# Patient Record
Sex: MALE | Race: WHITE | NOT HISPANIC OR LATINO | URBAN - METROPOLITAN AREA
[De-identification: names, ages, dates, MRNs, and addresses within clinical notes are randomized per-mention and may not be internally consistent; named-entity substitution may affect disease eponyms.]

---

## 2023-09-27 PROBLEM — K21.9 GASTROESOPHAGEAL REFLUX DISEASE: Status: ACTIVE | Noted: 2023-09-27

## 2023-09-27 PROBLEM — K21.9 ACID REFLUX: Status: ACTIVE | Noted: 2023-09-27

## 2023-09-27 PROBLEM — E84.8 EXOCRINE PANCREATIC MANIFESTATION OF CYSTIC FIBROSIS (MULTI): Status: ACTIVE | Noted: 2023-09-27

## 2023-09-27 PROBLEM — E84.0 CYSTIC FIBROSIS WITH PULMONARY MANIFESTATIONS (MULTI): Status: ACTIVE | Noted: 2023-09-27

## 2023-09-27 PROBLEM — E84.9 CYSTIC FIBROSIS (MULTI): Status: ACTIVE | Noted: 2023-09-27

## 2023-09-27 PROBLEM — E84.9 CYSTIC FIBROSIS (MULTI): Status: RESOLVED | Noted: 2023-09-27 | Resolved: 2023-09-27

## 2023-09-27 PROBLEM — A49.8 PSEUDOMONAS AERUGINOSA INFECTION: Status: ACTIVE | Noted: 2023-09-27

## 2023-09-27 PROBLEM — D36.9 ADENOMATOUS POLYPS: Status: ACTIVE | Noted: 2023-09-27

## 2023-09-27 PROBLEM — R73.02 IMPAIRED GLUCOSE TOLERANCE: Status: ACTIVE | Noted: 2023-09-27

## 2023-09-27 RX ORDER — ELEXACAFTOR, TEZACAFTOR, AND IVACAFTOR 100-50-75
2 KIT ORAL
COMMUNITY
Start: 2019-11-14 | End: 2023-11-15

## 2023-09-27 RX ORDER — HYDROGEN PEROXIDE 3 %
1 SOLUTION, NON-ORAL MISCELLANEOUS
COMMUNITY
Start: 2021-06-09

## 2023-09-27 RX ORDER — OSELTAMIVIR PHOSPHATE 75 MG/1
75 CAPSULE ORAL 2 TIMES DAILY PRN
COMMUNITY
Start: 2019-03-28

## 2023-09-27 RX ORDER — PANCRELIPASE 24000; 76000; 120000 [USP'U]/1; [USP'U]/1; [USP'U]/1
CAPSULE, DELAYED RELEASE PELLETS ORAL
COMMUNITY
Start: 2014-08-12 | End: 2024-01-08 | Stop reason: SDUPTHER

## 2023-09-27 RX ORDER — TOBRAMYCIN 28 MG/1
4 CAPSULE ORAL; RESPIRATORY (INHALATION) 2 TIMES DAILY
COMMUNITY
End: 2023-10-04 | Stop reason: WASHOUT

## 2023-09-27 RX ORDER — DORNASE ALFA 1 MG/ML
2.5 SOLUTION RESPIRATORY (INHALATION)
COMMUNITY

## 2023-09-27 RX ORDER — ALBUTEROL SULFATE 90 UG/1
2 AEROSOL, METERED RESPIRATORY (INHALATION) EVERY 4 HOURS PRN
COMMUNITY
Start: 2020-04-01 | End: 2023-10-04

## 2023-09-27 RX ORDER — LEVALBUTEROL INHALATION SOLUTION 1.25 MG/3ML
1 SOLUTION RESPIRATORY (INHALATION) 2 TIMES DAILY
COMMUNITY
Start: 2015-03-25 | End: 2023-10-04

## 2023-09-27 RX ORDER — PEDIATRIC MULTIVIT 61/D3/VIT K 1500-800
2 CAPSULE ORAL DAILY
COMMUNITY
Start: 2021-06-09

## 2023-09-27 RX ORDER — LEVALBUTEROL TARTRATE 45 UG/1
2 AEROSOL, METERED ORAL EVERY 6 HOURS PRN
COMMUNITY
End: 2023-10-04

## 2023-10-03 ENCOUNTER — DOCUMENTATION (OUTPATIENT)
Dept: PEDIATRIC PULMONOLOGY | Facility: HOSPITAL | Age: 43
End: 2023-10-03
Payer: COMMERCIAL

## 2023-10-03 DIAGNOSIS — E84.8 EXOCRINE PANCREATIC MANIFESTATION OF CYSTIC FIBROSIS (MULTI): Primary | ICD-10-CM

## 2023-10-03 DIAGNOSIS — E84.0 CYSTIC FIBROSIS WITH PULMONARY MANIFESTATIONS (MULTI): ICD-10-CM

## 2023-10-03 NOTE — PROGRESS NOTES
Subjective   Patient ID: Tyler Lockett is a 43 y.o. male who presents for cystic fibrosis visit. Lc was open and agreeable to MHS/AA.     Social Work Visit Type: This is an Annual Visit for Tyler Lockett    Identifying Information                              : 1980  Assessment date: 10/3/2023    Objective     Patient accompanied by:  self    Parents:     Raised by:  with biological parent(s)  Parent/Guardian Info/Employment Status: Patient lives with wife and son   Relationship Status / Family Dynamic: :  Yes  How Long?  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 no      Health:  Knowledge of health:  good, Overall adherence:  good, Adherence with medications:  good, Managed by: patient, Understanding of medication:  good, and Adherence with appointments:   good    patient lives with wife and son  Does patient have adequate housing?:Owns home  Does patient feel safe in home?: Yes    Alcohol use: Yes  and Tobacco use: No   not applicable    Cognition/Mood/Affect:  Cognition:  normal  No impairment observed/reported No  The patient was neatly groomed, appropriately dressed and adequately nourished.  The patient reports his/her mood as:   Good, optimistic, stressed   Spouse (Parent if child) Mental health concerns:  none  What are the patient's psychosocial stressors:  work, managing health, and baby    Cognitive Function / Health Literacy  How do you best learn new information? Tactical learner  Do you have any history of developmental delays/learning differences/special education/OT/PT/speech therapy? no    Have you noticed any problems with or changes in:  Your attention span/concentration level for conversations, TV, reading, etc.? no  Episodes of disorientation/getting lost while driving or at a store?  no  Your ability to manage your medical regimen? yes    Thought Processes   Organized?  yes  Have you ever been hospitalized in a psychiatric hospital? no  Do you currently or have you ever seen a therapist/counselor/psychiatrist? yes  Have you used medications for mental health issues, sleep and/or pain now or in the past?  no    Legal Issues:  Citizenship:  [place] Citizen: Yes    Advance Directives  Do you have an advance directive/living will? no  Do you have a DPOA for healthcare or finances?  yes - for finances only  has an advanced directive - a copy HAS NOT been provided.  Advanced directives status:  Not activated     Strengths:  strong family and friend support  What are the patient's coping behaviors?  Stays active, hobbies, interest in cars, spends a lot of time with son, alone time, be outside     Barriers:  none reported     Impression:  PHQ-9 score: 0, FRANNY-7 score: 4    Assessment/Plan   Plan: SW met patient for AA/MHS. Patient reports brain fog from Trikafta and is working with MD on a solution. Discussed IVF funding for CF patients, SHAVON will send information via email [email or web address]. Patient had no other  related needs. SHAVON will continue to follow.  -NAKIA Smith

## 2023-10-04 ENCOUNTER — ANCILLARY PROCEDURE (OUTPATIENT)
Dept: RESPIRATORY THERAPY | Facility: HOSPITAL | Age: 43
End: 2023-10-04
Payer: COMMERCIAL

## 2023-10-04 ENCOUNTER — DOCUMENTATION (OUTPATIENT)
Dept: PEDIATRIC PULMONOLOGY | Facility: HOSPITAL | Age: 43
End: 2023-10-04

## 2023-10-04 ENCOUNTER — HOSPITAL ENCOUNTER (OUTPATIENT)
Dept: RADIOLOGY | Facility: HOSPITAL | Age: 43
Discharge: HOME | End: 2023-10-04
Payer: COMMERCIAL

## 2023-10-04 ENCOUNTER — NUTRITION (OUTPATIENT)
Dept: PEDIATRIC PULMONOLOGY | Facility: HOSPITAL | Age: 43
End: 2023-10-04

## 2023-10-04 ENCOUNTER — LAB REQUISITION (OUTPATIENT)
Dept: LAB | Facility: HOSPITAL | Age: 43
End: 2023-10-04
Payer: COMMERCIAL

## 2023-10-04 ENCOUNTER — MULTIDISCIPLINARY VISIT (OUTPATIENT)
Dept: PEDIATRIC PULMONOLOGY | Facility: HOSPITAL | Age: 43
End: 2023-10-04
Payer: COMMERCIAL

## 2023-10-04 VITALS
BODY MASS INDEX: 24.55 KG/M2 | HEIGHT: 72 IN | OXYGEN SATURATION: 96 % | WEIGHT: 181.22 LBS | RESPIRATION RATE: 16 BRPM | TEMPERATURE: 97.9 F | DIASTOLIC BLOOD PRESSURE: 87 MMHG | HEART RATE: 94 BPM | SYSTOLIC BLOOD PRESSURE: 121 MMHG

## 2023-10-04 DIAGNOSIS — E84.0 CYSTIC FIBROSIS WITH PULMONARY MANIFESTATIONS (MULTI): ICD-10-CM

## 2023-10-04 DIAGNOSIS — D36.9 ADENOMATOUS POLYPS: ICD-10-CM

## 2023-10-04 DIAGNOSIS — K21.9 GASTROESOPHAGEAL REFLUX DISEASE WITHOUT ESOPHAGITIS: Chronic | ICD-10-CM

## 2023-10-04 DIAGNOSIS — E84.0 CYSTIC FIBROSIS WITH PULMONARY MANIFESTATIONS (MULTI): Primary | Chronic | ICD-10-CM

## 2023-10-04 DIAGNOSIS — R73.02 IMPAIRED GLUCOSE TOLERANCE: Chronic | ICD-10-CM

## 2023-10-04 DIAGNOSIS — E84.8 CYSTIC FIBROSIS WITH OTHER MANIFESTATIONS (MULTI): ICD-10-CM

## 2023-10-04 DIAGNOSIS — A49.8 PSEUDOMONAS AERUGINOSA INFECTION: Chronic | ICD-10-CM

## 2023-10-04 DIAGNOSIS — E84.9 CF (CYSTIC FIBROSIS) (MULTI): ICD-10-CM

## 2023-10-04 DIAGNOSIS — E84.8 EXOCRINE PANCREATIC MANIFESTATION OF CYSTIC FIBROSIS (MULTI): Chronic | ICD-10-CM

## 2023-10-04 DIAGNOSIS — E84.8 EXOCRINE PANCREATIC MANIFESTATION OF CYSTIC FIBROSIS (MULTI): ICD-10-CM

## 2023-10-04 LAB
25(OH)D3 SERPL-MCNC: 36 NG/ML (ref 30–100)
ALBUMIN SERPL BCP-MCNC: 4.3 G/DL (ref 3.4–5)
ALP SERPL-CCNC: 88 U/L (ref 33–120)
ALT SERPL W P-5'-P-CCNC: 62 U/L (ref 10–52)
ANION GAP SERPL CALC-SCNC: 15 MMOL/L (ref 10–20)
AST SERPL W P-5'-P-CCNC: 89 U/L (ref 9–39)
BASOPHILS # BLD AUTO: 0.08 X10*3/UL (ref 0–0.1)
BASOPHILS NFR BLD AUTO: 1 %
BILIRUB SERPL-MCNC: 1 MG/DL (ref 0–1.2)
BUN SERPL-MCNC: 12 MG/DL (ref 6–23)
CALCIUM SERPL-MCNC: 9 MG/DL (ref 8.6–10.6)
CHLORIDE SERPL-SCNC: 102 MMOL/L (ref 98–107)
CO2 SERPL-SCNC: 29 MMOL/L (ref 21–32)
CREAT SERPL-MCNC: 0.73 MG/DL (ref 0.5–1.3)
DLCO: NORMAL
EOSINOPHIL # BLD AUTO: 0.18 X10*3/UL (ref 0–0.7)
EOSINOPHIL NFR BLD AUTO: 2.1 %
ERYTHROCYTE [DISTWIDTH] IN BLOOD BY AUTOMATED COUNT: 14.3 % (ref 11.5–14.5)
FEV1/FVC: 53 %
FEV1: 2.28 LITERS
FVC: 4.29 LITERS
GFR SERPL CREATININE-BSD FRML MDRD: >90 ML/MIN/1.73M*2
GGT SERPL-CCNC: 20 U/L (ref 5–64)
GLUCOSE 2H P 75 G GLC PO SERPL-MCNC: 170 MG/DL
GLUCOSE P FAST SERPL-MCNC: 52 MG/DL
GLUCOSE SERPL-MCNC: 52 MG/DL (ref 74–99)
HCT VFR BLD AUTO: 47.2 % (ref 41–52)
HGB BLD-MCNC: 16.4 G/DL (ref 13.5–17.5)
IMM GRANULOCYTES # BLD AUTO: 0.02 X10*3/UL (ref 0–0.7)
IMM GRANULOCYTES NFR BLD AUTO: 0.2 % (ref 0–0.9)
LYMPHOCYTES # BLD AUTO: 3.18 X10*3/UL (ref 1.2–4.8)
LYMPHOCYTES NFR BLD AUTO: 37.9 %
MCH RBC QN AUTO: 29.4 PG (ref 26–34)
MCHC RBC AUTO-ENTMCNC: 34.7 G/DL (ref 32–36)
MCV RBC AUTO: 85 FL (ref 80–100)
MONOCYTES # BLD AUTO: 0.77 X10*3/UL (ref 0.1–1)
MONOCYTES NFR BLD AUTO: 9.2 %
NEUTROPHILS # BLD AUTO: 4.16 X10*3/UL (ref 1.2–7.7)
NEUTROPHILS NFR BLD AUTO: 49.6 %
NRBC BLD-RTO: 0 /100 WBCS (ref 0–0)
PLATELET # BLD AUTO: 278 X10*3/UL (ref 150–450)
PMV BLD AUTO: 9.7 FL (ref 7.5–11.5)
POTASSIUM SERPL-SCNC: 3.8 MMOL/L (ref 3.5–5.3)
PROT SERPL-MCNC: 7.3 G/DL (ref 6.4–8.2)
RBC # BLD AUTO: 5.57 X10*6/UL (ref 4.5–5.9)
SODIUM SERPL-SCNC: 142 MMOL/L (ref 136–145)
TLC: NORMAL
WBC # BLD AUTO: 8.4 X10*3/UL (ref 4.4–11.3)

## 2023-10-04 PROCEDURE — 87102 FUNGUS ISOLATION CULTURE: CPT

## 2023-10-04 PROCEDURE — 82977 ASSAY OF GGT: CPT

## 2023-10-04 PROCEDURE — 82306 VITAMIN D 25 HYDROXY: CPT

## 2023-10-04 PROCEDURE — 84402 ASSAY OF FREE TESTOSTERONE: CPT

## 2023-10-04 PROCEDURE — 84446 ASSAY OF VITAMIN E: CPT

## 2023-10-04 PROCEDURE — 82785 ASSAY OF IGE: CPT

## 2023-10-04 PROCEDURE — 94010 BREATHING CAPACITY TEST: CPT

## 2023-10-04 PROCEDURE — 84590 ASSAY OF VITAMIN A: CPT

## 2023-10-04 PROCEDURE — 99215 OFFICE O/P EST HI 40 MIN: CPT | Performed by: PEDIATRICS

## 2023-10-04 PROCEDURE — 71046 X-RAY EXAM CHEST 2 VIEWS: CPT | Performed by: RADIOLOGY

## 2023-10-04 PROCEDURE — 85025 COMPLETE CBC W/AUTO DIFF WBC: CPT

## 2023-10-04 PROCEDURE — 71046 X-RAY EXAM CHEST 2 VIEWS: CPT | Mod: FY

## 2023-10-04 PROCEDURE — 82950 GLUCOSE TEST: CPT

## 2023-10-04 PROCEDURE — 80053 COMPREHEN METABOLIC PANEL: CPT

## 2023-10-04 PROCEDURE — 83951 ONCOPROTEIN DCP: CPT

## 2023-10-04 PROCEDURE — 87070 CULTURE OTHR SPECIMN AEROBIC: CPT

## 2023-10-04 PROCEDURE — 89240 UNLISTED MISC PATH TEST: CPT

## 2023-10-04 SDOH — ECONOMIC STABILITY: INCOME INSECURITY: IN THE LAST 12 MONTHS, WAS THERE A TIME WHEN YOU WERE NOT ABLE TO PAY THE MORTGAGE OR RENT ON TIME?: NO

## 2023-10-04 SDOH — HEALTH STABILITY: MENTAL HEALTH: HOW OFTEN DO YOU HAVE 6 OR MORE DRINKS ON ONE OCCASION?: NEVER

## 2023-10-04 SDOH — ECONOMIC STABILITY: HOUSING INSECURITY
IN THE LAST 12 MONTHS, WAS THERE A TIME WHEN YOU DID NOT HAVE A STEADY PLACE TO SLEEP OR SLEPT IN A SHELTER (INCLUDING NOW)?: NO

## 2023-10-04 SDOH — ECONOMIC STABILITY: TRANSPORTATION INSECURITY
IN THE PAST 12 MONTHS, HAS THE LACK OF TRANSPORTATION KEPT YOU FROM MEDICAL APPOINTMENTS OR FROM GETTING MEDICATIONS?: NO

## 2023-10-04 SDOH — HEALTH STABILITY: MENTAL HEALTH
STRESS IS WHEN SOMEONE FEELS TENSE, NERVOUS, ANXIOUS, OR CAN'T SLEEP AT NIGHT BECAUSE THEIR MIND IS TROUBLED. HOW STRESSED ARE YOU?: NOT AT ALL

## 2023-10-04 SDOH — HEALTH STABILITY: MENTAL HEALTH: HOW OFTEN DO YOU HAVE A DRINK CONTAINING ALCOHOL?: MONTHLY OR LESS

## 2023-10-04 SDOH — ECONOMIC STABILITY: TRANSPORTATION INSECURITY
IN THE PAST 12 MONTHS, HAS LACK OF TRANSPORTATION KEPT YOU FROM MEETINGS, WORK, OR FROM GETTING THINGS NEEDED FOR DAILY LIVING?: NO

## 2023-10-04 SDOH — HEALTH STABILITY: MENTAL HEALTH: HOW MANY STANDARD DRINKS CONTAINING ALCOHOL DO YOU HAVE ON A TYPICAL DAY?: 1 OR 2

## 2023-10-04 SDOH — SOCIAL STABILITY: SOCIAL NETWORK: HOW OFTEN DO YOU ATTEND CHURCH OR RELIGIOUS SERVICES?: NEVER

## 2023-10-04 SDOH — SOCIAL STABILITY: SOCIAL NETWORK
IN A TYPICAL WEEK, HOW MANY TIMES DO YOU TALK ON THE PHONE WITH FAMILY, FRIENDS, OR NEIGHBORS?: MORE THAN THREE TIMES A WEEK

## 2023-10-04 SDOH — ECONOMIC STABILITY: GENERAL
WHICH OF THE FOLLOWING DO YOU KNOW HOW TO USE AND HAVE ACCESS TO EVERY DAY? (CHOOSE ALL THAT APPLY): SMARTPHONE WITH CELLULAR DATA PLAN;DESKTOP COMPUTER, LAPTOP COMPUTER, OR TABLET WITH BROADBAND INTERNET CONNECTION

## 2023-10-04 ASSESSMENT — PATIENT HEALTH QUESTIONNAIRE - PHQ9
8. MOVING OR SPEAKING SO SLOWLY THAT OTHER PEOPLE COULD HAVE NOTICED. OR THE OPPOSITE, BEING SO FIGETY OR RESTLESS THAT YOU HAVE BEEN MOVING AROUND A LOT MORE THAN USUAL: NOT AT ALL
7. TROUBLE CONCENTRATING ON THINGS, SUCH AS READING THE NEWSPAPER OR WATCHING TELEVISION: NOT AT ALL
1. LITTLE INTEREST OR PLEASURE IN DOING THINGS: NOT AT ALL
3. TROUBLE FALLING OR STAYING ASLEEP OR SLEEPING TOO MUCH: NOT AT ALL
4. FEELING TIRED OR HAVING LITTLE ENERGY: NOT AT ALL
2. FEELING DOWN, DEPRESSED OR HOPELESS: NOT AT ALL
9. THOUGHTS THAT YOU WOULD BE BETTER OFF DEAD, OR OF HURTING YOURSELF: NOT AT ALL
5. POOR APPETITE OR OVEREATING: NOT AT ALL
6. FEELING BAD ABOUT YOURSELF - OR THAT YOU ARE A FAILURE OR HAVE LET YOURSELF OR YOUR FAMILY DOWN: NOT AT ALL

## 2023-10-04 ASSESSMENT — ANXIETY QUESTIONNAIRES
2. NOT BEING ABLE TO STOP OR CONTROL WORRYING: SEVERAL DAYS
GAD7 TOTAL SCORE: 5
7. FEELING AFRAID AS IF SOMETHING AWFUL MIGHT HAPPEN: SEVERAL DAYS
3. WORRYING TOO MUCH ABOUT DIFFERENT THINGS: SEVERAL DAYS
6. BECOMING EASILY ANNOYED OR IRRITABLE: NOT AT ALL
4. TROUBLE RELAXING: SEVERAL DAYS
1. FEELING NERVOUS, ANXIOUS, OR ON EDGE: SEVERAL DAYS
IF YOU CHECKED OFF ANY PROBLEMS ON THIS QUESTIONNAIRE, HOW DIFFICULT HAVE THESE PROBLEMS MADE IT FOR YOU TO DO YOUR WORK, TAKE CARE OF THINGS AT HOME, OR GET ALONG WITH OTHER PEOPLE: SOMEWHAT DIFFICULT
5. BEING SO RESTLESS THAT IT IS HARD TO SIT STILL: NOT AT ALL

## 2023-10-04 ASSESSMENT — PAIN SCALES - GENERAL: PAINLEVEL: 0-NO PAIN

## 2023-10-04 ASSESSMENT — LIFESTYLE VARIABLES
SKIP TO QUESTIONS 9-10: 1
AUDIT-C TOTAL SCORE: 1

## 2023-10-04 NOTE — PATIENT INSTRUCTIONS
"It was very nice to see you today. We can offer you in-person and \"virtual\" appointments with your cystic fibrosis provider.    If you need to cancel or schedule an appointment, please call the  at the [facility] at [phone] between the hours of 8 AM and 5 PM, Monday-Friday.    To reach the CF nurse, Cecilia, please call [phone]. Cecilia has a secure voice mail account if you want to leave a message.    If you need to speak with an adult cystic fibrosis provider between the hours of 5 PM and 8 AM (overnight) or anytime on Saturday or Sunday, please call [phone]. When you call this number, you will be connected to an  with the [facility] answering service. You should tell the  that you're an adult with cystic fibrosis who needs to speak to the \"cystic fibrosis doctor on-call.\" The  will take your contact information. You should then expect a call back from the cystic fibrosis doctor on-call within a short period of time.      Information on COVID-19 Vaccination:  Vaccines.gov (https://www.vaccines.gov/)  [place] Department of Health (https://gettheshot.coronavirus.[place].gov/)    Important Information:  Your CFTR mutations are:     Your percent-predicted FEV1 today was:    Your weight adjusted for height (body mass index, BMI) today was:   Body mass index is 24.31 kg/m².   (goal for adult males with CF = 23.0 kg/m2, goal for adult females with CF = 22.0 kg/m2)      Plan:  Keep up the good work  Let us know when you are ready to switch to a CF center in [place]  We'd love to see you in November to review your labs  Watch for any symptoms of Trikafta  Keep up your work with your primary care.  I'm only taking care of your CF  Good luck with your colonoscopy            "

## 2023-10-04 NOTE — SIGNIFICANT EVENT
FRANNY-7   10/04/23 1400   Over the last 2 weeks, how often have you been bothered by any of the following problems?   Feeling nervous, anxious, or on edge 1   Not being able to stop or control worrying 1   Worrying too much about different things 1   Trouble relaxing 1   Being so restless that it is hard to sit still 0   Becoming easily annoyed or irritable 0   Feeling afraid as if something awful might happen 1   FRANNY-7 Total Score 5   If you checked off any problems on this questionnaire,   How difficult have these problems made it for you to do your work, take care of things at home, or get along with other people? Somewhat difficult

## 2023-10-04 NOTE — PROGRESS NOTES
Emilia Wolfe M.D. [facility]    Background:    [place] Pulmonary Exacerbation Score (PES)        HPI (10/4/2023):    Here for annual visit    Exercising 4-5 times a week at [employer]  Not sick since last November    Bowels moving okay  No constipation    No cough  No sputum - maybe a couple of days here and there  More when he is run down  Had work annual labs    Mom first time heart failure  Grandma was diabetic  Mom is being monitored for sugars  Dad has prostate cancer  Dad moving to [place]  Likely will switch to a center in [place]    Side effects:  Foggy   If missed Trikafta he can tell a difference  Not doing any airway clearance but       Health Maintenance  Your Annual Labs were labs were last done: 11/2022  Annual Labs are next due: 11/22  Your last CXR was done:   Your next CXR will be due:  Your last DEXA scan was:  DEXA is next due:  Your last colonoscopy was done: 11/2020  Your next colonoscopy will be due: 11/2023  Your last audiology screening was: 3/2019    Date of Review: 2/16/2023    Recommendations:   1. Yearly transplant discussion  2. Audiology - on Sha   3. once a month home pft  4. consider nocturnal pulse ox study - evaluate for symptoms        Primary Cystic Fibrosis Physician: Dr. Shaneka Baca  Primary Care Physician: Pt will call with information- Has chosen a PCP but has not see yet.     Initial Diagnosis: At birth, had meconium ileus requiring surgery. Sweat chloride 76 mEq/L on 1980  Genotype: [id number] / [id number]    Diagnosis:   Cystic Fibrosis   - CFTR modulator: Trikafta  - high dose ibuprofen: no  - Azithromycin: no    Pancreatic insufficiency   - creon 24's w/meals and snacks    GERD  - esomeprazole     Impaired glucose tolerance  - had elevated OGTT  - seen by Dr. Riley     Procedures & Oral/IV course:  - Last admission for CF pulmonary exacerbation 3/8/19- 3/30/19. Treated with IV Colistin and Meropenem.    History: Covid in 2020 (recovered),  colon polyps   Hemoptysis:  DVT: no  Pneumothorax: no  CARLOS: age 14-15 had an obstruction, did clean out w/Golytely   Sinus surgery: no  Fertility: wife had a baby in June 2022    Nursing Issues:   Typically grows: 1/2020 Pseudomonas mucoid and rough   Long term Azithromycin therapy: No  CF Cx: 11/2022 PSAm  Last acid fast: 6/2021 negative  Last fungal culture:11/2022negative  Annual labs:11/2022  CXR: 6/2021  Audiology: 3/13/2019   IV access: PICC line when needed  PSQI: 3/10/2021  [employer]: consented 6/9/2021  Transplant Ed: unknown     Respiratory: 02/16/2023  FEV1%: 51% November 2022  Airway clearance: exercise [employer] 3 - 4 times a week (afflo vest as needed)  Hypertonic saline: No, too harsh and causes extreme coughing  Pulmozyme: no stopped (August 2020)  Inhaled abx: no ([place] in the past) (wilber podhaler discontinued 7.29.20)  Other inhaled meds: no (levalbuterol in the past)  Oxygen: No, Nocturnal pulse ox study done 9/18/2018 almost qualified 4.9 minutes at or below 88%, repeat nocturnal pulse ox study done while an in-patient March 2019, no desats below 88%.  Recommendations: use home spirometer once a month. If unable to exercise, use afflo vest. Consider nocturnal pulse ox study on room air.       Nutritional Assessment:   - Nutritional status: BMI >25 (25.2)  - Body image: Pt. interested in wt. loss  - Body Composition: HGS within 1 SD of average for age  - Supplements/TF: Ensure Plus  - GI symptom tracker: Needs 23  - Balanced diet: Pt. has been increasing fruit and vegetable intake  - Calcium intake: Adequate  - Programs (Enzyme/WIC/[facility]): [facility]  - OGTT: Needs 23  - Vitamin levels: Needs 23  - Colonoscopy: Adenomas 2020; Repeat 23 (NICE?)  - DEXA: Osteopenia 21; Repeat 24      Emily Nageotte RDN, LD       Psychosocial:   Substance use:none  Reproduction/ relationships:, has a son  Insurance responsibilities: Private insurance, no concerns  Disease progression/ Future planning:  discuss with team  Advance directives: was planning on completing in [place], follow up on  Transplant/palliative care: discuss with team  Recommendations/summary: Tyler is a 43 y.o. , with a new baby. He lives in [place] and works remotely. He has concerns about the economy and raising a child now that he's a new dad. His wife is supportive regarding his health. No insurance concerns at this time. Had concerns about [employer] program, concerns passed to pharmacy. Plans to complete HCPOA, recommended that he complete in [place].  will continue to provide support as needed.  NAKIA Smith     Research: Lc can be referred to [place] for research if PFT's are >40%.    Pharmacy: Tyler has the CFTR genotype [id number] and [id number], been on Trikafta since Dec 2019, doing well on it.   Medication related labs: LFTs drawn Nov 2022 (WNL) repeat annually.  Drug-Drug interactions: Drug-drug interactions were evaluated, no significant drug-drug interactions identified.  Medication access: He fills with [employer] [facility] and nonspecialty meds from [employer] - no known obtainment issues. Uses [employer] program.  Immunizations: Declines bivalent COVID booster and 2022 season flu vaccine. Received PCV vaccine in 2019.  Summary: Continue current therapies. Annual LFTs.  Rajwinder Hamilton, JennaD, BCPS, BCPPS              Current Medications     Current Outpatient Medications   Medication Instructions    elexacaftor-tezacaftor-ivacaft (Trikafta) 100-50-75 mg tablet 2 tablets, oral,  Take 2 elexacaftor 100/tezacaftor 50/ivacaftor 75 tabs in the morning and 1 ivacaftor 150 tab in the evening 12 hours apart     esomeprazole (NexIUM) 20 mg DR capsule 1 capsule, oral, Daily before breakfast    lipase-protease-amylase (Creon) 24,000-76,000 -120,000 unit capsule oral, Take 5 capsules by mouth before meals and 2-3 capsules with snacks daily    oseltamivir (TAMIFLU) 75 mg, oral, 2 times daily PRN    pediatric multivit 61-D3-vit K (MVW Complete  Formulation ) 5,000-800 unit-mcg capsule 2 capsules, oral, Daily    Pulmozyme 2.5 mg, nebulization, Daily RT       Physical Examination     General: ambulated independently; no acute distress; well-nourished; work of breathing was not increased; normal vocal character  HEENT: normocephalic; anicteric sclerae; conjunctivae not injected; nasal mucosa was unremarkable; oropharynx was clear without evidence of thrush; dentition was good.  Neck: supple; no lymphadenopathy or thyromegaly.  Chest: clear to auscultation bilaterally; no chest wall deformity.  Cardiac: regular rhythm; no gallop or murmur.  Abdomen: soft; non-tender; non-distended; no hepatosplenomegaly.  Extremities: no leg edema; no digital clubbing; 2+ pulses  Psychiatric: did not appear depressed or anxious.     Metabolic Parameters     Sodium   Date/Time Value Ref Range Status   11/23/2022 09:16  136 - 145 mmol/L Final     Potassium   Date/Time Value Ref Range Status   11/23/2022 09:16 AM 4.3 3.5 - 5.3 mmol/L Final     Chloride   Date/Time Value Ref Range Status   11/23/2022 09:16  98 - 107 mmol/L Final     Bicarbonate   Date/Time Value Ref Range Status   11/23/2022 09:16 AM 30 21 - 32 mmol/L Final     Anion Gap   Date/Time Value Ref Range Status   11/23/2022 09:16 AM 12 10 - 20 mmol/L Final     Urea Nitrogen   Date/Time Value Ref Range Status   11/23/2022 09:16 AM 15 6 - 23 mg/dL Final     Creatinine   Date/Time Value Ref Range Status   11/23/2022 09:16 AM 0.77 0.50 - 1.30 mg/dL Final     GFR MALE   Date/Time Value Ref Range Status   11/23/2022 09:16 AM >90 >90 mL/min/1.73m2 Final     Comment:      CALCULATIONS OF ESTIMATED GFR ARE PERFORMED   USING THE 2021 CKD-EPI STUDY REFIT EQUATION   WITHOUT THE RACE VARIABLE FOR THE IDMS-TRACEABLE   CREATININE METHODS.    https://jasn.asnjournals.org/content/early/2021/09/22/ASN.6408423255       Glucose   Date/Time Value Ref Range Status   11/23/2022 09:16 AM 90 74 - 99 mg/dL Final     Calcium    Date/Time Value Ref Range Status   11/23/2022 09:16 AM 8.9 8.6 - 10.6 mg/dL Final       Hematologic Parameters     WBC   Date/Time Value Ref Range Status   11/23/2022 09:16 AM 7.5 4.4 - 11.3 x10E9/L Final     Neutrophils Absolute   Date/Time Value Ref Range Status   11/23/2022 09:16 AM 3.81 1.20 - 7.70 x10E9/L Final     Neutrophils %   Date/Time Value Ref Range Status   11/23/2022 09:16 AM 50.8 40.0 - 80.0 % Final     Lymphocytes %   Date/Time Value Ref Range Status   11/23/2022 09:16 AM 34.4 13.0 - 44.0 % Final     Monocytes %   Date/Time Value Ref Range Status   11/23/2022 09:16 AM 9.6 2.0 - 10.0 % Final     Basophils %   Date/Time Value Ref Range Status   11/23/2022 09:16 AM 0.8 0.0 - 2.0 % Final     Eosinophils Absolute   Date/Time Value Ref Range Status   11/23/2022 09:16 AM 0.31 0.00 - 0.70 x10E9/L Final     Eosinophils %   Date/Time Value Ref Range Status   11/23/2022 09:16 AM 4.1 0.0 - 6.0 % Final     Hemoglobin   Date/Time Value Ref Range Status   11/23/2022 09:16 AM 15.6 13.5 - 17.5 g/dL Final     Hematocrit   Date/Time Value Ref Range Status   11/23/2022 09:16 AM 45.7 41.0 - 52.0 % Final     RBC   Date/Time Value Ref Range Status   11/23/2022 09:16 AM 5.18 4.50 - 5.90 x10E12/L Final     MCV   Date/Time Value Ref Range Status   11/23/2022 09:16 AM 88 80 - 100 fL Final     MCHC   Date/Time Value Ref Range Status   11/23/2022 09:16 AM 34.1 32.0 - 36.0 g/dL Final     Platelets   Date/Time Value Ref Range Status   11/23/2022 09:16  150 - 450 x10E9/L Final       Fat-Soluble Vitamin Levels     Vitamin D, 25-Hydroxy   Date/Time Value Ref Range Status   11/23/2022 09:16 AM 36 ng/mL Final     Comment:     .  DEFICIENCY:         < 20   NG/ML  INSUFFICIENCY:      20-29  NG/ML  SUFFICIENCY:         NG/ML    THIS ASSAY ACCURATELY QUANTIFIES THE SUM OF  VITAMIN D3, 25-HYDROXY AND VIT D2,25-HYDROXY.       Vitamin A (Retinol)   Date/Time Value Ref Range Status   11/23/2022 09:16 AM 33.4 20.1 - 62.0 ug/dL  Final     Comment:     Reference intervals for vitamin A determined from [facility] internal  studies. Individuals with vitamin A less than 20 ug/dL are considered  vitamin A deficient and those with serum concentrations less than  10 ug/dL are considered severely deficient.  This test was developed and its performance characteristics  determined by [facility]. It has not been cleared or approved  by the Food and Drug Administration.  Test(s) 070141-Vitamin E(Alpha Tocopherol); 000793-  Vitamin E(Gamma Tocopherol)  was developed and its performance characteristics determined  by [facility]. It has not been cleared or approved by the Food  and Drug Administration.       Vitamin E (Alpha-Tocopherol)   Date/Time Value Ref Range Status   11/23/2022 09:16 AM 5.4 (L) 7.0 - 25.1 mg/L Final     Vitamin E (Gamma-Tocopherol)   Date/Time Value Ref Range Status   11/23/2022 09:16 AM 0.6 0.5 - 5.5 mg/L Final     Comment:     Reference intervals for alpha and gamma-tocopherol determined from  National Health and Nutrition Examination Survey, 3621-1800.  Individuals with alpha-tocopherol levels less than 5.0 mg/L are  considered vitamin E deficient.  Test(s) 070141-Vitamin E(Alpha Tocopherol); 951857-  Vitamin E(Gamma Tocopherol)  was developed and its performance characteristics determined  by [facility]. It has not been cleared or approved by the Food  and Drug Administration.         LFT and Associated Parameters     AST   Date/Time Value Ref Range Status   11/23/2022 09:16 AM 23 9 - 39 U/L Final     ALT (SGPT)   Date/Time Value Ref Range Status   11/23/2022 09:16 AM 44 10 - 52 U/L Final     Comment:      Patients treated with Sulfasalazine may generate    falsely decreased results for ALT.       Alkaline Phosphatase   Date/Time Value Ref Range Status   11/23/2022 09:16 AM 80 33 - 120 U/L Final     Total Bilirubin   Date/Time Value Ref Range Status   11/23/2022 09:16 AM 0.6 0.0 - 1.2 mg/dL Final     Bilirubin, Direct   Date/Time Value Ref  Range Status   11/04/2020 02:56 PM 0.2 0.0 - 0.3 mg/dL Final     GGT   Date/Time Value Ref Range Status   11/23/2022 09:16 AM 19 5 - 64 U/L Final     Albumin   Date/Time Value Ref Range Status   11/23/2022 09:16 AM 4.1 3.4 - 5.0 g/dL Final     Total Protein   Date/Time Value Ref Range Status   11/23/2022 09:16 AM 6.6 6.4 - 8.2 g/dL Final       Coagulation Parameters     Protime   Date/Time Value Ref Range Status   11/23/2022 09:16 AM 10.4 9.8 - 13.4 sec Final     INR   Date/Time Value Ref Range Status   11/23/2022 09:16 AM 0.9 0.9 - 1.1 Final       Diabetes Laboratory Tests     Hemoglobin A1C   Date/Time Value Ref Range Status   11/23/2022 09:16 AM 5.7 (A) % Final     Comment:          Diagnosis of Diabetes-Adults   Non-Diabetic: < or = 5.6%   Increased risk for developing diabetes: 5.7-6.4%   Diagnostic of diabetes: > or = 6.5%  .       Monitoring of Diabetes                Age (y)     Therapeutic Goal (%)   Adults:          >18           <7.0   Pediatrics:    13-18           <7.5                   7-12           <8.0                   0- 6            7.5-8.5   American Diabetes Association. Diabetes Care 33(S1), Jan 2010.       Albumin/Creatine Ratio   Date/Time Value Ref Range Status   06/09/2021 12:00 AM 6.1 0.0 - 30.0 ug/mg crt Final     Glucose tolerance test Fasting   Date/Time Value Ref Range Status   11/23/2022 09:40  <126 mg/dL Final     Glucose tolerance test Two Hour   Date/Time Value Ref Range Status   11/23/2022 09:40 AM 75 <200 mg/dL Final        Immunology Laboratory Tests     IgE   Date/Time Value Ref Range Status   11/23/2022 09:16 AM <2 0 - 214 IU/mL Final       DEXA Scan     No results found for this or any previous visit from the past 1000 days.       Chest Radiograph     CHEST 2 VIEW 06/09/2021    Narrative  MRN: [id number]  Patient Name: CRISTINO OLVERA    STUDY:   CHEST 2 VIEW PA AND LAT;  6/9/2021 1:03 pm    INDICATION:  cystic fibrosis.    COMPARISON:  03/08/2019.    ACCESSION  "NUMBER(S):  [id number]    ORDERING CLINICIAN:  SHANEKA BACA    FINDINGS:      CARDIOMEDIASTINAL SILHOUETTE:  Prominence of the jose may represent residual lymphadenopathy versus  pulmonary artery hypertension. This is stable.    LUNGS:  Interval improvement in multiple nodular opacities with residual  peribronchial thickening. Residual middle lobe/lingular atelectasis.  Left midlung nodular density may represent residual mucoid impaction  versus nipple shadow.    ABDOMEN:  No remarkable upper abdominal findings.    BONES:  No acute osseous changes.    Impression  1.  Interval improvement in extensive mucoid impaction/peribronchial  disease when compared to a study of 03/08/2019. Residual apical  scarring and left midlung opacity which may represent mucoid  impaction versus prominent nipple shadow.       CF Sputum Culture     No results found for: \"AFBCX\"   No results found for: \"RESPCULTCYFI\"    No results found for the last 90 days.      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 NO pain No constipation         Impaired glucose tolerance    Current Assessment & Plan     Resolved last time, but will recheck today per guidelines         Pseudomonas aeruginosa infection    Current Assessment & Plan     Chronic colonization         Acid reflux          Shaneka Baca MD PhD   10/04/2023   "

## 2023-10-04 NOTE — SIGNIFICANT EVENT
PHQ-9   10/04/23 1400   Over the last 2 weeks, how often have you been bothered by any of the following problems?   Little Interest or Pleasure in Doing Things Not at all   Feeling Down, Depressed, or Hopeless Not at all   Trouble Falling or Staying Asleep, or Sleeping Too Much Not at all   Feeling Tired or Having Little Energy Not at all   Poor Appetite or Overeating Not at all   Feeling Bad About Yourself - or That You Are a Failure or Have Let Yourself or Your Family Down Not at all   Trouble Concentrating on Things, Such as Reading the Newspaper or Watching Television Not at all   Moving or speaking so slowly that other people could have noticed? Or the opposite - being so fidgety or restless that you have been moving around a lot more than usual. Not at all   Thoughts That You Would Be Better Off Dead or Hurting Yourself in Some Way Not at all

## 2023-10-04 NOTE — PROGRESS NOTES
PT Evaluation: CF Clinic      Pain  0/10    Social History  Pt lives at home with wife and 15 months old son. Works full time in IT, travels for work and also [facility].   Shared household and childcare duties.    Respiratory  What do you do for Airway clearance?  Exercise only. Has not needed to do traditional ACT since starting Trikafta.     Modulator Therapy: Trikafta                                       Exercise: current program  [employer] 4-5x/week: weight machines , treadmill walking(20 min) and running(4-5 min)  AM workouts  Very active outdoors (snowboarding, etc)     FEV1 today: 51%  Osteopenia/Osteoporosis: +Osteopenia     Musculoskeletal    Standing lateral side bend: WFL    Standing trunk rotation, flexion, extension WFL      MMT:   R LE 5/5  L LE 5/5    Exercise Tests/Outcome Measures: RPD/RPE on all tests  1)Sit to Stand  60 sec STS : 24x   bpm, spO2 94% on room air  RPD 1/10, RPE 10/20      2)   3 min Step test: metronome to 96  HR/O2 PRE test : HR 85 bpm, spO2 95%    Immediately post    bpm  O2 93%    AFTER 1 min rest:  %  O2 96%    3) Plank Hold  Total time: 29 seconds  *Lc was sore from recent workouts, likely would hold plank much longer but limited 2/2 abd soreness     What body part gave out first?  Abdomen    Assessment:  Lc is a 42 y/o male who presents to the CF clinic, pleasant and agreeable to participate in a PT evaluation as part of his clinic today. Lc is very active, and exercises regularly. He scored the following on exercise testing today:  1)1 min STS: 25th percentile for age/sex matched norms  2)3 min step test: average HR recovery for age/sex matched norms  PT will plan to follow up with patient at subsequent visits when indicated.

## 2023-10-04 NOTE — PROGRESS NOTES
Respiratory Note:  10/4/23  Patient's Airway Clearance: [place] Vest  Frequency: PRN when sick  Settings: Ramps up x 3 x 15-30 min  Exercise: Works out at the gym 3-5 times per week cardio and circuit training  Oscillating Device: No  Barry Cough: No  Primary: Exercise  Secondary: Vest    Aerosols: Name of medication, frequency, type of nebulizer used, Mask or Mouthpiece?  Bronchodilators: No inhaled medications at this time  Pulmozyme: no  Hypertonic Saline: no  Antibiotics: no  ICS/Combinations: no    Order of medication w/airway clearance: N/A      Hearing Test:   Spacer: Yes  Compressor: Has a working compressor  Home PFT Device: NEREYDA Spirobank  Frequency: Monthly and PRN (has not used since Aug 2022)  Technique: Good  Home PFT Action Plan: Yes    Method of Cleaning Neb Cups: Top of the   Method of Cleaning PFT Device: soap and water and sterile water, then soak in 3% hydrogen peroxide x 30 min.    Pharmacy for respiratory meds: [facility]  Patient Assistance Program:   Oxygen: No  Home Care Company:  LPM:  Continuous, nocturnal, PRN, intermittent w/exacerbation: No  CPAP, BIPAP, Assisted Breathing (use at home or in-patient): No  Have you smoked cigarettes in the last year?: No  Are you exposed to second hand smoke or electronic cigarette vapor?: No  Does anyone in your household smoke cigarettes?: No  Did this patient use electronic cigarettes (vape) this year? No  During the reporting year, how often was this patient vaping? Not at all  Every Day...Some days...Not at all...Unknown.      Teaching: Lc's health has been very good since the initiation of Trikafta. He has a very good exercise regimen and his lung function has remained much improved and stable. Lc includes vest therapy when he is sick. No respiratory needs today.

## 2023-10-04 NOTE — PROGRESS NOTES
"Pt. Is here alone today. Son is now 15 months. Recently drove with son and Dad from [place] to [place] for car repairs.     Pt. Reports his appetite has been good. Wife does cooking and grocery shopping. She \"sneaks\" healthier foods into meals. Pt. Denies having any GI concerns. Pt. Goes to the gym 4 days/week. Pt. Has Colonoscopy scheduled for September. RD provided prep instructions to the pt. Pt. Used to track BS when started Trikafta, but was told he could stop because his blood sugars were well-managed. Pt. Is having annual labs drawn today.    ALLISON: 72.8 (fit), PBF 27.2%      Impression:  BMI at goal  Pt. Having vitamin labs drawn today  Pt. Eats well-balanced diet  Pt. Is physically active  Pt. Has colonoscopy scheduled for November    Recommendations:  Recommend maintain level of physical activity  Recommend continue to eat well-balanced diet  RD will reach out to pt. If labs are abnormal  Recommend reach out to RD as nutritional concerns arise    "

## 2023-10-04 NOTE — PROGRESS NOTES
Nurse Assessment: 10/4/2023    Pain assessment: denied   SOB: denied   3.   O2:  __N/a___L  4.   Mediport: n/a  5.   Pt discussion/chief complaint/agenda:  Not concerns with his health. Colonoscopy scheduled with Dr. Moraes on 11/21/23. Desean is growing and doing well.

## 2023-10-05 LAB — IGE SERPL-ACNC: 3 IU/ML (ref 0–214)

## 2023-10-06 LAB — BACTERIA SPT CF RESP CULT: NORMAL

## 2023-10-08 LAB
A-TOCOPHEROL VIT E SERPL-MCNC: 7.5 MG/L (ref 5.5–18)
ANNOTATION COMMENT IMP: NORMAL
BETA+GAMMA TOCOPHEROL SERPL-MCNC: 0.3 MG/L (ref 0–6)
RETINYL PALMITATE SERPL-MCNC: <0.02 MG/L (ref 0–0.1)
TESTOSTERONE FREE (CHAN): 72.8 PG/ML (ref 35–155)
TESTOSTERONE,TOTAL,LC-MS/MS: 607 NG/DL (ref 250–1100)
VIT A SERPL-MCNC: 0.4 MG/L (ref 0.3–1.2)

## 2023-10-09 LAB — SCAN RESULT: NORMAL

## 2023-10-23 LAB
FUNGUS SPEC CULT: NORMAL
FUNGUS SPEC FUNGUS STN: NORMAL

## 2023-10-29 LAB
HOLD SPECIMEN: NORMAL
HOLD SPECIMEN: NORMAL

## 2023-11-13 DIAGNOSIS — E84.9 CYSTIC FIBROSIS (MULTI): ICD-10-CM

## 2023-11-15 RX ORDER — ELEXACAFTOR, TEZACAFTOR, AND IVACAFTOR 100-50-75
KIT ORAL
Qty: 84 TABLET | Refills: 11 | Status: SHIPPED | OUTPATIENT
Start: 2023-11-15

## 2023-11-20 ENCOUNTER — DOCUMENTATION (OUTPATIENT)
Dept: GASTROENTEROLOGY | Facility: HOSPITAL | Age: 43
End: 2023-11-20
Payer: COMMERCIAL

## 2023-11-20 DIAGNOSIS — E84.9 CYSTIC FIBROSIS (MULTI): ICD-10-CM

## 2023-11-20 RX ORDER — SYRING-NEEDL,DISP,INSUL,0.3 ML 29 G X1/2"
SYRINGE, EMPTY DISPOSABLE MISCELLANEOUS
Qty: 296 ML | Refills: 0 | Status: SHIPPED | OUTPATIENT
Start: 2023-11-20

## 2023-11-20 RX ORDER — POLYETHYLENE GLYCOL 3350, SODIUM SULFATE ANHYDROUS, SODIUM BICARBONATE, SODIUM CHLORIDE, POTASSIUM CHLORIDE 236; 22.74; 6.74; 5.86; 2.97 G/4L; G/4L; G/4L; G/4L; G/4L
POWDER, FOR SOLUTION ORAL
Qty: 4000 ML | Refills: 1 | OUTPATIENT
Start: 2023-11-20 | End: 2023-11-21

## 2023-11-20 NOTE — PROGRESS NOTES
Patient called about colonoscopy prep which hadn't been ordered for his upcoming colonoscopy tomorrow. I called the patient and he informed me that Dr. Anton had ordered it for him and he was waiting at the [facility] to pick it up. I provided some brief advice on how to take the prep. I told him to call the Endoscopy suite in the morning around 7am if he is still not clear by that time.     Patient voiced understanding and thanked me for following up on his phone call.

## 2023-11-21 ENCOUNTER — HOSPITAL ENCOUNTER (OUTPATIENT)
Dept: GASTROENTEROLOGY | Facility: HOSPITAL | Age: 43
Setting detail: OUTPATIENT SURGERY
Discharge: HOME | End: 2023-11-21
Payer: COMMERCIAL

## 2023-11-21 VITALS
BODY MASS INDEX: 24.52 KG/M2 | WEIGHT: 185 LBS | DIASTOLIC BLOOD PRESSURE: 85 MMHG | OXYGEN SATURATION: 94 % | HEART RATE: 94 BPM | HEIGHT: 73 IN | SYSTOLIC BLOOD PRESSURE: 113 MMHG | RESPIRATION RATE: 21 BRPM | TEMPERATURE: 97.5 F

## 2023-11-21 DIAGNOSIS — Z86.010 HISTORY OF ADENOMATOUS POLYP OF COLON: ICD-10-CM

## 2023-11-21 DIAGNOSIS — D36.9 BENIGN NEOPLASM, UNSPECIFIED SITE: ICD-10-CM

## 2023-11-21 DIAGNOSIS — E84.9 CYSTIC FIBROSIS, UNSPECIFIED (MULTI): Primary | ICD-10-CM

## 2023-11-21 PROCEDURE — 45385 COLONOSCOPY W/LESION REMOVAL: CPT | Performed by: INTERNAL MEDICINE

## 2023-11-21 PROCEDURE — 88305 TISSUE EXAM BY PATHOLOGIST: CPT | Mod: TC,SUR | Performed by: INTERNAL MEDICINE

## 2023-11-21 PROCEDURE — 2500000004 HC RX 250 GENERAL PHARMACY W/ HCPCS (ALT 636 FOR OP/ED): Performed by: INTERNAL MEDICINE

## 2023-11-21 PROCEDURE — 3700000013 HC SEDATION LEVEL 5+ TIME - EACH ADDITIONAL 15 MINUTES: Performed by: INTERNAL MEDICINE

## 2023-11-21 PROCEDURE — 99153 MOD SED SAME PHYS/QHP EA: CPT | Performed by: INTERNAL MEDICINE

## 2023-11-21 PROCEDURE — 7100000010 HC PHASE TWO TIME - EACH INCREMENTAL 1 MINUTE: Performed by: INTERNAL MEDICINE

## 2023-11-21 PROCEDURE — 88305 TISSUE EXAM BY PATHOLOGIST: CPT | Performed by: PATHOLOGY

## 2023-11-21 PROCEDURE — 99152 MOD SED SAME PHYS/QHP 5/>YRS: CPT | Performed by: INTERNAL MEDICINE

## 2023-11-21 PROCEDURE — 7100000009 HC PHASE TWO TIME - INITIAL BASE CHARGE: Performed by: INTERNAL MEDICINE

## 2023-11-21 PROCEDURE — 3700000012 HC SEDATION LEVEL 5+ TIME - INITIAL 15 MINUTES 5/> YEARS: Performed by: INTERNAL MEDICINE

## 2023-11-21 RX ORDER — FENTANYL CITRATE 50 UG/ML
INJECTION, SOLUTION INTRAMUSCULAR; INTRAVENOUS AS NEEDED
Status: COMPLETED | OUTPATIENT
Start: 2023-11-21 | End: 2023-11-21

## 2023-11-21 RX ORDER — DIPHENHYDRAMINE HYDROCHLORIDE 50 MG/ML
INJECTION INTRAMUSCULAR; INTRAVENOUS AS NEEDED
Status: COMPLETED | OUTPATIENT
Start: 2023-11-21 | End: 2023-11-21

## 2023-11-21 RX ORDER — MIDAZOLAM HYDROCHLORIDE 1 MG/ML
INJECTION, SOLUTION INTRAMUSCULAR; INTRAVENOUS AS NEEDED
Status: COMPLETED | OUTPATIENT
Start: 2023-11-21 | End: 2023-11-21

## 2023-11-21 RX ORDER — SODIUM CHLORIDE, SODIUM LACTATE, POTASSIUM CHLORIDE, CALCIUM CHLORIDE 600; 310; 30; 20 MG/100ML; MG/100ML; MG/100ML; MG/100ML
20 INJECTION, SOLUTION INTRAVENOUS CONTINUOUS
Status: DISCONTINUED | OUTPATIENT
Start: 2023-11-21 | End: 2023-11-22 | Stop reason: HOSPADM

## 2023-11-21 RX ADMIN — MIDAZOLAM HYDROCHLORIDE 1 MG: 1 INJECTION, SOLUTION INTRAMUSCULAR; INTRAVENOUS at 13:31

## 2023-11-21 RX ADMIN — DIPHENHYDRAMINE HYDROCHLORIDE 25 MG: 50 INJECTION, SOLUTION INTRAMUSCULAR; INTRAVENOUS at 13:29

## 2023-11-21 RX ADMIN — FENTANYL CITRATE 25 MCG: 50 INJECTION, SOLUTION INTRAMUSCULAR; INTRAVENOUS at 13:31

## 2023-11-21 RX ADMIN — MIDAZOLAM HYDROCHLORIDE 2 MG: 1 INJECTION, SOLUTION INTRAMUSCULAR; INTRAVENOUS at 13:25

## 2023-11-21 RX ADMIN — FENTANYL CITRATE 50 MCG: 50 INJECTION, SOLUTION INTRAMUSCULAR; INTRAVENOUS at 13:24

## 2023-11-21 RX ADMIN — DIPHENHYDRAMINE HYDROCHLORIDE 25 MG: 50 INJECTION, SOLUTION INTRAMUSCULAR; INTRAVENOUS at 13:25

## 2023-11-21 RX ADMIN — MIDAZOLAM HYDROCHLORIDE 1 MG: 1 INJECTION, SOLUTION INTRAMUSCULAR; INTRAVENOUS at 13:58

## 2023-11-21 RX ADMIN — FENTANYL CITRATE 25 MCG: 50 INJECTION, SOLUTION INTRAMUSCULAR; INTRAVENOUS at 13:44

## 2023-11-21 RX ADMIN — MIDAZOLAM HYDROCHLORIDE 1 MG: 1 INJECTION, SOLUTION INTRAMUSCULAR; INTRAVENOUS at 13:35

## 2023-11-21 RX ADMIN — MIDAZOLAM HYDROCHLORIDE 1 MG: 1 INJECTION, SOLUTION INTRAMUSCULAR; INTRAVENOUS at 13:43

## 2023-11-21 RX ADMIN — FENTANYL CITRATE 25 MCG: 50 INJECTION, SOLUTION INTRAMUSCULAR; INTRAVENOUS at 13:38

## 2023-11-21 ASSESSMENT — PAIN SCALES - GENERAL

## 2023-11-21 ASSESSMENT — PAIN - FUNCTIONAL ASSESSMENT
PAIN_FUNCTIONAL_ASSESSMENT: 0-10

## 2023-11-21 ASSESSMENT — COLUMBIA-SUICIDE SEVERITY RATING SCALE - C-SSRS
1. IN THE PAST MONTH, HAVE YOU WISHED YOU WERE DEAD OR WISHED YOU COULD GO TO SLEEP AND NOT WAKE UP?: NO
2. HAVE YOU ACTUALLY HAD ANY THOUGHTS OF KILLING YOURSELF?: NO
6. HAVE YOU EVER DONE ANYTHING, STARTED TO DO ANYTHING, OR PREPARED TO DO ANYTHING TO END YOUR LIFE?: NO

## 2023-11-21 NOTE — PRE-SEDATION DOCUMENTATION
Patient: Tyler Lockett  MRN: [id number]    Pre-sedation Evaluation:  Sedation necessary for: Other: Colonoscopy  Requesting service: GI    History of Present Illness: Surveillance colonoscopy for adenomatous polyps.  CRC in paternal grandfather diagnosed age >60 and multiple polyps in patient's father.     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 Take before meals.      lipase-protease-amylase (Creon) 24,000-76,000 -120,000 unit capsule Take by mouth. Take 5 capsules by mouth before meals and 2-3 capsules with snacks daily      magnesium citrate (Citrate of Magnesia) solution Take as directed for colonoscopy 296 mL 0    oseltamivir (Tamiflu) 75 mg capsule Take 1 capsule (75 mg) by mouth 2 times a day as needed (fever or flu-like symptoms).      pediatric multivit 61-D3-vit K (MVW Complete Formulation ) 5,000-800 unit-mcg capsule Take 2 capsules by mouth once daily.      Trikafta 100-50-75 mg(d) /150 mg (n) tablet TAKE 2 ORANGE TABLETS IN THE MORNING AND 1 BLUE TABLET IN THE EVENING APPROXIMATELY 12 HOURS APART. TAKE WITH FATTY FOODS 84 tablet 11     dornase Alpha (Pulmozyme) 1 mg/mL nebulizer solution Take 2.5 mg by nebulization once daily.      polyethylene glycol-electrolytes (polyethylene glycol) 420 gram solution Dispense two 4,000ml bottles - take ad directed for colonoscopy/ (Patient not taking: Reported on 11/21/2023) 4000 mL 1     Current Facility-Administered Medications   Medication Dose Route Frequency Provider Last Rate Last Admin    lactated Ringer's infusion  20 mL/hr intravenous Continuous Nalini Moraes MD         Past Surgical History:   has no past surgical history on file.    Recent sedation/surgery (24 hours) No    Review of Systems:  Please check all that apply: No significant difficulty with sedation        NPO guidelines met: Yes    Physical Exam    Airway  Mallampati: II     Cardiovascular   Rhythm: regular     Dental    Pulmonary   Breath sounds clear to auscultation  (+) decreased breath sounds         Plan    ASA 3     Moderate   (Patient's procedure was ordered with moderate sedation but he asked for anesthesia today.  I was not able to get anesthesia staff on short notice however.  Patient is agreeable to proceeding with moderate sedation.)

## 2023-11-21 NOTE — DISCHARGE INSTRUCTIONS
Patient Instructions after a Colonoscopy      The anesthetics, sedatives or narcotics which were given to you today will be acting in your body for the next 24 hours, so you might feel a little sleepy or groggy.  This feeling should slowly wear off. Carefully read and follow the instructions.     You received sedation today:  - Do not drive or operate any machinery or power tools of any kind.   - No alcoholic beverages today, not even beer or wine.  - Do not make any important decisions or sign any legal documents.  - No over the counter medications that contain alcohol or that may cause drowsiness.  - Do not make any important decisions or sign any legal documents.    While it is common to experience mild to moderate abdominal distention, gas, or belching after your procedure, if any of these symptoms occur following discharge from the GI Lab or within one week of having your procedure, call the [facility] to be advised whether a visit to your nearest Urgent Care or Emergency Department is indicated.  Take this paper with you if you go.     - If you develop an allergic reaction to the medications that were given during your procedure such as difficulty breathing, rash, hives, severe nausea, vomiting or lightheadedness.  - If you experience chest pain, shortness of breath, severe abdominal pain, fevers and chills.  -If you develop signs and symptoms of bleeding such as blood in your spit, if your stools turn black, tarry, or bloody  - If you have not urinated within 8 hours following your procedure.  - If your IV site becomes painful, red, inflamed, or looks infected.    If you received a biopsy/polypectomy/sphincterotomy the following instructions apply below:    __ Do not use Aspirin containing products, non-steroidal medications or anti-coagulants for one week following your procedure. (Examples of these types of medications are: Advil, Arthrotec, Aleve, Coumadin, Ecotrin, Heparin, Ibuprofen,  Indocin, Motrin, Naprosyn, Nuprin, Plavix, Vioxx, and Voltarin, or their generic forms.  This list is not all-inclusive.  Check with your physician or pharmacist before resuming medications.)   __ Eat a soft diet today.  Avoid foods that are poorly digested for the next 24 hours.  These foods would include: nuts, beans, lettuce, red meats, and fried foods. Start with liquids and advance your diet as tolerated, gradually work up to eating solids.   __ Do not have a Barium Study or Enema for one week.    Your physician recommends the additional following instructions:    -You have a contact number available for emergencies. The signs and symptoms of potential delayed complications were discussed with you. You may return to normal activities tomorrow.  -Resume your previous diet.  -Continue your present medications.   -We are waiting for your pathology results.  -Your physician has recommended a repeat colonoscopy (date to be determined after pending pathology results are reviewed) for surveillance based on pathology results.  -The findings and recommendations have been discussed with you.  -The findings and recommendations were discussed with your family.  - Please see Medication Reconciliation Form for new medication/medications prescribed.       If you experience any problems or have any questions following discharge from the GI Lab, please call:        Nurse Signature                                                                        Date___________________                                                                            Patient/Responsible Party Signature                                        Date___________________

## 2023-11-21 NOTE — H&P
"History Of Present Illness  Tyler Lockett is a 43 y.o. male presenting with surveillance colonoscopy, had 3 adenomatous polyps in 2020.     Past Medical History  Past Medical History:   Diagnosis Date    COVID-19 08/04/2020    COVID-19    Cystic fibrosis (CMS/HCC)     Personal history of colonic polyps     History of colon polyps       Surgical History  History reviewed. No pertinent surgical history.     Social History  He reports that he has never smoked. He has never used smokeless tobacco. He reports that he does not drink alcohol and does not use drugs.    Family History  No family history on file.     Allergies  Patient has no known allergies.      Physical Exam   NAD   Decreased breath sounds but CTA   RRR S1 S2   Soft nontender nondistended   +Clubbing    Last Recorded Vitals  Blood pressure 130/80, pulse 85, temperature 36.4 °C (97.5 °F), resp. rate 20, height 1.854 m (6' 1\"), weight 83.9 kg (185 lb), SpO2 94 %.    Relevant Results  Colonoscopy 2020 - 3 adenomatous polyps.     Assessment/Plan   Active Problems:  There are no active Hospital Problems.    Surveillance colonoscopy in 2020.  Procedure was ordered with moderate sedation.  Patient requested anesthesia but it is unfortunately not available on short notice.      Nalini Moraes MD    "

## 2023-12-06 LAB
LABORATORY COMMENT REPORT: NORMAL
PATH REPORT.FINAL DX SPEC: NORMAL
PATH REPORT.GROSS SPEC: NORMAL
PATH REPORT.TOTAL CANCER: NORMAL

## 2024-01-08 DIAGNOSIS — E84.8 EXOCRINE PANCREATIC MANIFESTATION OF CYSTIC FIBROSIS (MULTI): ICD-10-CM

## 2024-01-08 DIAGNOSIS — E84.0 CYSTIC FIBROSIS WITH PULMONARY MANIFESTATIONS (MULTI): Primary | ICD-10-CM

## 2024-01-08 RX ORDER — PANCRELIPASE 24000; 76000; 120000 [USP'U]/1; [USP'U]/1; [USP'U]/1
CAPSULE, DELAYED RELEASE PELLETS ORAL
Qty: 1900 CAPSULE | Refills: 3 | Status: SHIPPED | OUTPATIENT
Start: 2024-01-08 | End: 2025-01-07

## 2024-01-16 DIAGNOSIS — E84.9 CYSTIC FIBROSIS (MULTI): Primary | ICD-10-CM

## 2024-02-13 ENCOUNTER — ALLIED HEALTH (OUTPATIENT)
Dept: PEDIATRIC PULMONOLOGY | Facility: HOSPITAL | Age: 44
End: 2024-02-13
Payer: COMMERCIAL

## 2024-02-13 NOTE — PROGRESS NOTES
Test  Result  Flag  Reference    -----NURSING 2/16/2023 [place]      Annual labs 11/2022      Annual Visit/ Case Review CR 2/116/23      Chest XRay 6/2021      Audiology (see below)      Result:  Pt is on Sha podhaler - 3/13/2019    -----NUTRITION EN 9-23 RD Annual Assessment Needs 23      Nutritional Status BMI >25      Body Composition HGS within 1 SD of average for age      Body Image Pt. interested in wt. loss      Supplements/TF Ensure Plus      Seen by GI No      GI Symptom Tracker Needs 23      Balanced Diet (see below)      Result:  Increasing fruit and vegetable intake    Calcium Intake Adequate      Programs (Enzyme /WIC/ [facility]) Careforward      Pancreatic Enzymes Creon      DEXA Osteopenia 21; Repeat 24      OGTT Needs 23      Seen by Endo No      Vitamin Levels Needs 23      Colonoscopy Repeat 23; TAs 2020      Transplant/Pregnancy Nutrition Needs N/A      CFTR Modulator Trikafta started 11/2019      -----RESEARCH PSQI 3/10/2021      Biobank Consented 6/9/2021            Findings History:            [id number]   Item Name 1 1 1 1 1 2 1   Advance Directives      Will complete in [place]    Airway clearance  Exercise (is not using Afflo Vest) exercise (has not been using afflo vest)   Afflo Vest Afflo Vest /Aerobika   Annual labs 11/2022 Pt will have done in [place] on 10/31/22; Emailed orders 8/30/22 6/9/21  3/2020  Annual labs done Mar 2019 (vitamin A & E done Jan 2019)   Balanced Diet Increasing fruit and vegetable intake Increasing fruits and vegetables Increasing fruit and vegetable intake   Pt. has low fruit and vegetable intake; Pt.'s family helping to improve intake assess   Body Composition HGS within 1 SD of average for age HGS within 1 SD of average for age HGS within 1 SD of average for age   HGS within 1SD of average for age    Body Image Pt. interested in wt. loss Pt. interested in wt. loss Pt. interested in losing some wt.   Pt. pleased  "with wt. gain no issues noted   Calcium Intake Adequate Adequate Adequate   Pt. consumes calcium-rich foods multiple times daily encouraged aydee in light of DXA results   CFTR Modulator Trikafta started 11/2019  Trikafta last labs 2/21/2020; due now  Trikafta Trikafta 12/2/2019; Symdeko   Colonoscopy Repeat 23; TAs 2020 Repeat 23 Repeat 23; Adenomas 2020   Done 2020-awating pathology Colonoscopy - done 3/2019 - had 6 polyps removed, 2 clips placed - need repeat colonoscopy in 2020   DEXA Osteopenia 21; Repeat 24 Osteopenia 21; Repeat 24 6/9/2021   Needs repeat 2021 3/11//2019 osteopenia of LS. repeat in 2021   EDUCATION      Annual apt done 1/8/2020, CR 1/31/2020, folder mailed to pt    Flu vaccine  Declined 2022   -- Declined 11/2020 Refused 10/22/19    GI Symptom Tracker Needs 23 Needs 22 Needs 21   Done 2020; No concerns needed   High Risk Behaviors      no concerns    Hypertonic Saline  No no   No is too harsh and causes extreme coughing No PRN   Ibuprofen   No   not on high dose ibuprofen IBU PK - not on high dose Ibuprofen   Infection Control  Booster declined 11/23/2022; COVID Pfizer 5/6/21; 3/15/2021    Tested positive for COVID 7/2020    Insurance Responsibilities      Private    Lung Disease Severity  FEV1 51% June 2021    Intermediate Advanced   Mediport  no     None   Mental Health Screen      1/8/2020 01/21/2019   NURSING 2/16/2023 [place] 10/24/22    Audio 3/13/2019 CXR 3/8/19: 7/19 allergey testing recommended for next visit; Needs PCP \"academic internist\" perferred; nocturnal desat done 3/19 - no O2 needed   NUTRITION EN 9-23 EN 11-22 EN 6-21   EN  10-19 TS   Nutritional status BMI >25 BMI at goal BMI >25   BMI at goal borderline at risk (BMI 22.76)   OGTT Needs 23 Elevated fasting Repeat 21   WNL 3/29/19 2 hour 175-IGT. discussed w pt. No symptoms and BMI close to normal. Trying to cut back on soda   Pancreatic Enzymes Creon Creon Creon   Creon Creon   PHARMACY   6/6/21 EM   [facility]   Pneumovax     3/2019     Programs (Enzyme /WIC/ [facility]) Careforward Careforward Careforward   Creon program Sent info on Creon program over summer. Thinks he had enrolled in past but wasn't currently using   PULMONARY  11/21/22 06/03/2021   10/28/2020 10.22.19 PA   Pulmozyme  No (D/C August 2020) no stopped August 2020   Yes Qday yes QD   RD Annual Assessment Needs 23 Needs 22 Needs 21   Done 2020 done for 2019   Reproduction/Relationships      Engaged, Discussed IVF with MD Got engaged, will be doing IVF in about two years   RESEARCH PSQI 3/10/2021  PQSI 3/10/21       RT Annual Assessment   done 02/02/2021   1.8.20 Completed 10.22.19 PA   Substance Use      no concerns    Supplements/TF Ensure Plus Ensure Plus Ensure Plus   Ensure Plus none   SW Assessment      1/8/2020 01/18/2019   Transplant/Pregnancy Nutrition Needs N/A N/A N/A   N/A    Vitamin Levels Needs 23 WNL Needs 21   WNL D and E deficiencies, encouraged regular vitamin supplementation.   Seen by GI No No No   No    Seen by Endo No No No   No    Inhaled Antibiotic  Cayston PRN not taking 8/31/22 (D/C Wilber Podhaler 07/2020) yes cayston as needed (stopped wilber podhaler 7.29.20)   Cayston QOM (7.29.20 stopping Wilber Pod per Dr. Baca)    Other Inhaled Med  Albuterol MDI, levalbuterol nebs PRN yes albuterol inhaler as needed   ProAir (was on Levalbuterol Tartrate but insurance rejected)    Biobank Consented 6/9/2021  Consent 6/9/21 1st draw       Annual Visit/ Case Review CR 2/116/23 6/9/22; CR: 2/17/22; sent ISO 2/8/22 6/9/2021; CR 2/18/2021  Needs in Feb 2021     Chest XRay 6/2021 6/2021 6/9/21  3/2019     Audiology Pt is on Wilber podhaler - 3/13/2019 3/13/2019        Azithromycin   No       Chronic Oral Antibiotics   No       Oxygen/non-invasive support  No no   no    Home Spirometer  NEREYDA Ayondo, last used August 2022 yes   Yes NEREYDA Spirobank    Referral to Pulmonary Rehab      no    CoPay Assistance      yes

## 2024-03-07 PROBLEM — E84.9 CYSTIC FIBROSIS (MULTI): Status: ACTIVE | Noted: 2024-03-07

## 2024-05-01 ENCOUNTER — TELEPHONE (OUTPATIENT)
Dept: PEDIATRIC PULMONOLOGY | Facility: HOSPITAL | Age: 44
End: 2024-05-01
Payer: COMMERCIAL

## 2024-11-04 DIAGNOSIS — E84.9 CYSTIC FIBROSIS: ICD-10-CM

## 2024-11-04 RX ORDER — ELEXACAFTOR, TEZACAFTOR, AND IVACAFTOR 100-50-75
KIT ORAL
Qty: 84 TABLET | Refills: 0 | Status: SHIPPED | OUTPATIENT
Start: 2024-11-04

## 2024-11-18 ENCOUNTER — DOCUMENTATION (OUTPATIENT)
Dept: PEDIATRIC PULMONOLOGY | Facility: HOSPITAL | Age: 44
End: 2024-11-18
Payer: COMMERCIAL

## 2024-11-18 DIAGNOSIS — E84.0 CYSTIC FIBROSIS WITH PULMONARY MANIFESTATIONS (MULTI): Primary | ICD-10-CM

## 2024-11-18 DIAGNOSIS — A49.8 PSEUDOMONAS AERUGINOSA INFECTION: ICD-10-CM

## 2024-11-18 DIAGNOSIS — E84.9 CF (CYSTIC FIBROSIS) (MULTI): ICD-10-CM

## 2024-11-18 RX ORDER — NEBULIZER
EACH MISCELLANEOUS
Qty: 1 EACH | Refills: 0 | Status: SHIPPED | OUTPATIENT
Start: 2024-11-18

## 2024-11-18 RX ORDER — ALBUTEROL SULFATE 0.83 MG/ML
2.5 SOLUTION RESPIRATORY (INHALATION) EVERY 6 HOURS PRN
Qty: 360 ML | Refills: 0 | Status: SHIPPED | OUTPATIENT
Start: 2024-11-18 | End: 2025-11-18

## 2024-11-18 RX ORDER — ALBUTEROL SULFATE 90 UG/1
2 INHALANT RESPIRATORY (INHALATION) EVERY 6 HOURS PRN
Qty: 18 G | Refills: 0 | Status: SHIPPED | OUTPATIENT
Start: 2024-11-18 | End: 2025-11-18

## 2024-11-18 RX ORDER — AZITHROMYCIN 250 MG/1
250 TABLET, FILM COATED ORAL DAILY
Qty: 10 TABLET | Refills: 0 | Status: SHIPPED | OUTPATIENT
Start: 2024-11-18 | End: 2024-11-29 | Stop reason: ALTCHOICE

## 2024-11-19 NOTE — PROGRESS NOTES
"I received this message from Cecilia Morocho:     \"2. Lc Lockett:  C/c: x 1 week - nasal congestion, yellow drainage, Lt ear piercing pain, ears feel full, fever on the 9th and 11th (100. F and 101.0F) intermittent low grade fevers. Coughing in the morning and at night only.  He is out of [facility].   Advised he may need to go to an urgent care or minute clinic to look in his ears.  Please advise. \"    [place]c [place] of [place] so I could not offer him a virtual appointment. He said he has been very healthy and usually has let small illness slide but this time he feels he needs to get in touch with his CF team. In the beginning of the month he was traveling for work - 4 states in 1 week and sleeping in multiple hotel rooms. Thinks he picked something up then. Two Saturdays ago (11/9) felt like symptoms hit him at once and was laying around most of the day. Had a sore throat, increased cough, thick yellow sputum, ears full. By 11/12 he felt slightly better but is still sick. He did restart Cayston which he had some extra at home. Prior to getting sick he hadn't taken it for years. I did discuss with him that mycoplasma pneumonia is on the rise and that I am going to treat him with azithromycin 250 mg PO for 10 days and refill his Cayston. I did also refill albuterol and recommended he used it prior to his inhaled antibiotic. He, his wife and son will be in [place] for about 6 days next week and he is willing to be seen for an in person follow up which he said is due. I also told him I would order sputum cultures and have Cecilia send them over via email for him to take to a local lab. If he doesn't do this then we can obtain sputum in clinic next week.    Marilia Morrell, CNP  "

## 2024-11-21 ENCOUNTER — TELEPHONE (OUTPATIENT)
Dept: PEDIATRIC PULMONOLOGY | Facility: HOSPITAL | Age: 44
End: 2024-11-21
Payer: COMMERCIAL

## 2024-11-21 NOTE — TELEPHONE ENCOUNTER
RD called pt. To check in. Pt. Reports that he is doing well. He has been traveling a lot    He admits to waiting on a refill for Creon. At this point, he is waiting for delivery to be shipped. He plans to call if he gets close to running out and it still hasn't been delivered. Pt. Denies having any GI concerns.He has been getting a variety of foods in his diet. He has not weighed himself recently, but plans to when able. Pt. Reports that his BS is well-managed. He goes to the gym 4x/week and does the treadmill and weights. Pt. Had labs drawn earlier this year. He plans to have them faxed to  for review.    Pt. Had no further concerns at this time. RD provided contact info and encouraged pt. To reach out as nutritional concerns arise. Pt. Was agreeable.

## 2024-11-27 DIAGNOSIS — E84.0 CYSTIC FIBROSIS WITH PULMONARY MANIFESTATIONS (MULTI): Primary | ICD-10-CM

## 2024-11-27 RX ORDER — NEBULIZER
EACH MISCELLANEOUS
Qty: 1 EACH | Refills: 0 | Status: SHIPPED | OUTPATIENT
Start: 2024-11-27

## 2024-11-29 ENCOUNTER — SOCIAL WORK (OUTPATIENT)
Dept: PEDIATRIC PULMONOLOGY | Facility: HOSPITAL | Age: 44
End: 2024-11-29
Payer: COMMERCIAL

## 2024-11-29 ENCOUNTER — HOSPITAL ENCOUNTER (OUTPATIENT)
Dept: RESPIRATORY THERAPY | Facility: HOSPITAL | Age: 44
Discharge: HOME | End: 2024-11-29
Payer: COMMERCIAL

## 2024-11-29 ENCOUNTER — ALLIED HEALTH (OUTPATIENT)
Dept: PEDIATRIC PULMONOLOGY | Facility: HOSPITAL | Age: 44
End: 2024-11-29
Payer: COMMERCIAL

## 2024-11-29 ENCOUNTER — MULTIDISCIPLINARY VISIT (OUTPATIENT)
Dept: PEDIATRIC PULMONOLOGY | Facility: HOSPITAL | Age: 44
End: 2024-11-29
Payer: COMMERCIAL

## 2024-11-29 ENCOUNTER — HOSPITAL ENCOUNTER (OUTPATIENT)
Dept: RADIOLOGY | Facility: HOSPITAL | Age: 44
Discharge: HOME | End: 2024-11-29
Payer: COMMERCIAL

## 2024-11-29 VITALS
TEMPERATURE: 98 F | DIASTOLIC BLOOD PRESSURE: 81 MMHG | BODY MASS INDEX: 24.62 KG/M2 | SYSTOLIC BLOOD PRESSURE: 129 MMHG | OXYGEN SATURATION: 95 % | RESPIRATION RATE: 18 BRPM | WEIGHT: 185.74 LBS | HEART RATE: 97 BPM | HEIGHT: 73 IN

## 2024-11-29 DIAGNOSIS — R74.01 ELEVATED LIVER TRANSAMINASE LEVEL: ICD-10-CM

## 2024-11-29 DIAGNOSIS — E84.0 CYSTIC FIBROSIS WITH PULMONARY MANIFESTATIONS (MULTI): ICD-10-CM

## 2024-11-29 DIAGNOSIS — E84.9 CYSTIC FIBROSIS: ICD-10-CM

## 2024-11-29 DIAGNOSIS — K21.9 GASTROESOPHAGEAL REFLUX DISEASE WITHOUT ESOPHAGITIS: ICD-10-CM

## 2024-11-29 DIAGNOSIS — E84.8 EXOCRINE PANCREATIC MANIFESTATION OF CYSTIC FIBROSIS (MULTI): ICD-10-CM

## 2024-11-29 DIAGNOSIS — A49.8 PSEUDOMONAS AERUGINOSA INFECTION: ICD-10-CM

## 2024-11-29 DIAGNOSIS — E84.9 CYSTIC FIBROSIS: Primary | ICD-10-CM

## 2024-11-29 LAB
25(OH)D3 SERPL-MCNC: 33 NG/ML (ref 30–100)
ALBUMIN SERPL BCP-MCNC: 4.1 G/DL (ref 3.4–5)
ALP SERPL-CCNC: 90 U/L (ref 33–120)
ALT SERPL W P-5'-P-CCNC: 38 U/L (ref 10–52)
ANION GAP SERPL CALC-SCNC: 11 MMOL/L (ref 10–20)
AST SERPL W P-5'-P-CCNC: 28 U/L (ref 9–39)
BASOPHILS # BLD AUTO: 0.1 X10*3/UL (ref 0–0.1)
BASOPHILS NFR BLD AUTO: 1 %
BILIRUB DIRECT SERPL-MCNC: 0.2 MG/DL (ref 0–0.3)
BILIRUB SERPL-MCNC: 0.7 MG/DL (ref 0–1.2)
BUN SERPL-MCNC: 11 MG/DL (ref 6–23)
CALCIUM SERPL-MCNC: 9.2 MG/DL (ref 8.6–10.6)
CHLORIDE SERPL-SCNC: 102 MMOL/L (ref 98–107)
CHOLEST SERPL-MCNC: 114 MG/DL (ref 0–199)
CHOLESTEROL/HDL RATIO: 3.3
CO2 SERPL-SCNC: 31 MMOL/L (ref 21–32)
CREAT SERPL-MCNC: 0.76 MG/DL (ref 0.5–1.3)
EGFRCR SERPLBLD CKD-EPI 2021: >90 ML/MIN/1.73M*2
EOSINOPHIL # BLD AUTO: 0.26 X10*3/UL (ref 0–0.7)
EOSINOPHIL NFR BLD AUTO: 2.6 %
ERYTHROCYTE [DISTWIDTH] IN BLOOD BY AUTOMATED COUNT: 13 % (ref 11.5–14.5)
EST. AVERAGE GLUCOSE BLD GHB EST-MCNC: 120 MG/DL
GGT SERPL-CCNC: 20 U/L (ref 5–64)
GLUCOSE SERPL-MCNC: 57 MG/DL (ref 74–99)
HBA1C MFR BLD: 5.8 %
HCT VFR BLD AUTO: 49.7 % (ref 41–52)
HDLC SERPL-MCNC: 34.3 MG/DL
HGB BLD-MCNC: 16.6 G/DL (ref 13.5–17.5)
HOLD SPECIMEN: NORMAL
IGE SERPL-ACNC: 4 IU/ML (ref 0–214)
IMM GRANULOCYTES # BLD AUTO: 0.03 X10*3/UL (ref 0–0.7)
IMM GRANULOCYTES NFR BLD AUTO: 0.3 % (ref 0–0.9)
LDLC SERPL CALC-MCNC: 61 MG/DL
LYMPHOCYTES # BLD AUTO: 3.48 X10*3/UL (ref 1.2–4.8)
LYMPHOCYTES NFR BLD AUTO: 35 %
MCH RBC QN AUTO: 28.5 PG (ref 26–34)
MCHC RBC AUTO-ENTMCNC: 33.4 G/DL (ref 32–36)
MCV RBC AUTO: 85 FL (ref 80–100)
MGC ASCENT PFT - FEV1 - PRE: 2.24
MGC ASCENT PFT - FEV1 - PREDICTED: 4.21
MGC ASCENT PFT - FVC - PRE: 4.53
MGC ASCENT PFT - FVC - PREDICTED: 5.27
MONOCYTES # BLD AUTO: 0.84 X10*3/UL (ref 0.1–1)
MONOCYTES NFR BLD AUTO: 8.5 %
NEUTROPHILS # BLD AUTO: 5.23 X10*3/UL (ref 1.2–7.7)
NEUTROPHILS NFR BLD AUTO: 52.6 %
NON HDL CHOLESTEROL: 80 MG/DL (ref 0–149)
NRBC BLD-RTO: 0 /100 WBCS (ref 0–0)
PHOSPHATE SERPL-MCNC: 3 MG/DL (ref 2.5–4.9)
PLATELET # BLD AUTO: 336 X10*3/UL (ref 150–450)
POTASSIUM SERPL-SCNC: 4.3 MMOL/L (ref 3.5–5.3)
PROT SERPL-MCNC: 7.3 G/DL (ref 6.4–8.2)
RBC # BLD AUTO: 5.83 X10*6/UL (ref 4.5–5.9)
SODIUM SERPL-SCNC: 140 MMOL/L (ref 136–145)
TRIGL SERPL-MCNC: 95 MG/DL (ref 0–149)
VLDL: 19 MG/DL (ref 0–40)
WBC # BLD AUTO: 9.9 X10*3/UL (ref 4.4–11.3)

## 2024-11-29 PROCEDURE — 87015 SPECIMEN INFECT AGNT CONCNTJ: CPT | Performed by: PEDIATRICS

## 2024-11-29 PROCEDURE — 85025 COMPLETE CBC W/AUTO DIFF WBC: CPT | Performed by: PEDIATRICS

## 2024-11-29 PROCEDURE — 99215 OFFICE O/P EST HI 40 MIN: CPT | Mod: 25 | Performed by: PEDIATRICS

## 2024-11-29 PROCEDURE — 83951 ONCOPROTEIN DCP: CPT | Performed by: PEDIATRICS

## 2024-11-29 PROCEDURE — 36415 COLL VENOUS BLD VENIPUNCTURE: CPT | Performed by: PEDIATRICS

## 2024-11-29 PROCEDURE — 82785 ASSAY OF IGE: CPT | Performed by: PEDIATRICS

## 2024-11-29 PROCEDURE — 87077 CULTURE AEROBIC IDENTIFY: CPT | Performed by: PEDIATRICS

## 2024-11-29 PROCEDURE — 76705 ECHO EXAM OF ABDOMEN: CPT

## 2024-11-29 PROCEDURE — 84153 ASSAY OF PSA TOTAL: CPT | Performed by: PEDIATRICS

## 2024-11-29 PROCEDURE — 84100 ASSAY OF PHOSPHORUS: CPT | Performed by: PEDIATRICS

## 2024-11-29 PROCEDURE — 83036 HEMOGLOBIN GLYCOSYLATED A1C: CPT | Performed by: PEDIATRICS

## 2024-11-29 PROCEDURE — 99215 OFFICE O/P EST HI 40 MIN: CPT | Performed by: PEDIATRICS

## 2024-11-29 PROCEDURE — 80061 LIPID PANEL: CPT | Performed by: PEDIATRICS

## 2024-11-29 PROCEDURE — 82977 ASSAY OF GGT: CPT | Performed by: PEDIATRICS

## 2024-11-29 PROCEDURE — 82306 VITAMIN D 25 HYDROXY: CPT | Performed by: PEDIATRICS

## 2024-11-29 PROCEDURE — 87102 FUNGUS ISOLATION CULTURE: CPT | Performed by: PEDIATRICS

## 2024-11-29 PROCEDURE — 84446 ASSAY OF VITAMIN E: CPT | Performed by: PEDIATRICS

## 2024-11-29 PROCEDURE — 94010 BREATHING CAPACITY TEST: CPT

## 2024-11-29 PROCEDURE — 84402 ASSAY OF FREE TESTOSTERONE: CPT | Performed by: PEDIATRICS

## 2024-11-29 PROCEDURE — 82248 BILIRUBIN DIRECT: CPT | Performed by: PEDIATRICS

## 2024-11-29 PROCEDURE — 84590 ASSAY OF VITAMIN A: CPT | Performed by: PEDIATRICS

## 2024-11-29 PROCEDURE — 82374 ASSAY BLOOD CARBON DIOXIDE: CPT | Performed by: PEDIATRICS

## 2024-11-29 RX ORDER — LEVOFLOXACIN 750 MG/1
750 TABLET ORAL EVERY 24 HOURS
Qty: 14 TABLET | Refills: 0 | Status: SHIPPED | OUTPATIENT
Start: 2024-11-29 | End: 2024-12-13

## 2024-11-29 RX ORDER — OSELTAMIVIR PHOSPHATE 75 MG/1
75 CAPSULE ORAL EVERY 12 HOURS
Qty: 10 CAPSULE | Refills: 2 | Status: SHIPPED | OUTPATIENT
Start: 2024-11-29 | End: 2024-12-04

## 2024-11-29 ASSESSMENT — PATIENT HEALTH QUESTIONNAIRE - PHQ9
8. MOVING OR SPEAKING SO SLOWLY THAT OTHER PEOPLE COULD HAVE NOTICED. OR THE OPPOSITE, BEING SO FIGETY OR RESTLESS THAT YOU HAVE BEEN MOVING AROUND A LOT MORE THAN USUAL: NOT AT ALL
2. FEELING DOWN, DEPRESSED OR HOPELESS: NOT AT ALL
6. FEELING BAD ABOUT YOURSELF - OR THAT YOU ARE A FAILURE OR HAVE LET YOURSELF OR YOUR FAMILY DOWN: NOT AT ALL
3. TROUBLE FALLING OR STAYING ASLEEP OR SLEEPING TOO MUCH: SEVERAL DAYS
9. THOUGHTS THAT YOU WOULD BE BETTER OFF DEAD, OR OF HURTING YOURSELF: NOT AT ALL
1. LITTLE INTEREST OR PLEASURE IN DOING THINGS: SEVERAL DAYS
5. POOR APPETITE OR OVEREATING: SEVERAL DAYS
7. TROUBLE CONCENTRATING ON THINGS, SUCH AS READING THE NEWSPAPER OR WATCHING TELEVISION: NOT AT ALL
4. FEELING TIRED OR HAVING LITTLE ENERGY: MORE THAN HALF THE DAYS

## 2024-11-29 ASSESSMENT — ANXIETY QUESTIONNAIRES
5. BEING SO RESTLESS THAT IT IS HARD TO SIT STILL: NOT AT ALL
6. BECOMING EASILY ANNOYED OR IRRITABLE: SEVERAL DAYS
2. NOT BEING ABLE TO STOP OR CONTROL WORRYING: SEVERAL DAYS
7. FEELING AFRAID AS IF SOMETHING AWFUL MIGHT HAPPEN: NOT AT ALL
1. FEELING NERVOUS, ANXIOUS, OR ON EDGE: SEVERAL DAYS
3. WORRYING TOO MUCH ABOUT DIFFERENT THINGS: NOT AT ALL
4. TROUBLE RELAXING: SEVERAL DAYS
GAD7 TOTAL SCORE: 4

## 2024-11-29 NOTE — PROGRESS NOTES
[facility] M[facility]    Background:  43 yo male with cystic fibrosis, here for his annual visit    Plan from last visit:  Keep up the good work  Let us know when you are ready to switch to a CF center in [place]  We'd love to see you in November to review your labs  Watch for any symptoms of Trikafta  Keep up your work with your primary care.  I'm only taking care of your CF  Good luck with your colonoscopy        HPI    Annual labs done: 10/2023  Chest imaging done: 10/2023  Audiology: 2019  Colonoscopy done: 11/2023  Colonoscopy due: 11/2026  DEXA done: ?  DEXA due: now  Liver ultrasound:  ordered today      HPI  Interval history:       Son is still sick, was around someone with walking pneumonia  Was treated with azithromycin for 10 days      Today was patient's annual visit  Please see case review document for  Patient's clinical care was discussed with the entire team and case review document was reviewed with team and patient  Team recommendations reviewed with patient.      Date of Review: 3/7/2024  Recommendations:       Yearly transplant discussion  - discussed, open to considering this in the future  Discuss memory and modification of Trikafta dose - is playing with night time dose  Offer PCV 20  Tdap  Home PFT  Nocturnal pulse ox - agrees  DEXA this year today  Ultrasound liver today    Respiratory Review of Systems:  Cough:  more than usual, but not a ton  Sputum:  no, not much for the most part, not most days  Hemoptysis: none  Chest Pain: none  Wheezing: none  Other Respiratory Symptoms:   Oxygen: never, did a nocturnal pulse ox in the past  Transplant eval:  exercise:  Still goes to [employer] four times a week  Sometimes sees FEV1 55% on home spirometer    Airway Clearance:    Gym in the morning  Shower    Only doing the gym for airway clearance  Aerosols are a part of breakfast before the gym  Albuterol  Not doing pulmozyme, has been 2-3 years since doing  it  Caroline was started because he was sick  Would like to order pulmozyme for now        GI Review of Systems:  Supplements: none  VASYL:  + GERD depending on foods, takes nexium when he knows he's going to need it   Stools: bowels moving well  Other: was in the 190s at one point, and lately 185-187 normally  Appetite is good      ENDO:  Has occasional low blood sugars when he feels shaky and weak, can count on one hand, usually when travel    Sinus:  Okay, ears are still kind of plugged, started with illness three weeks ago    Pain:  none    Neuro:    Started playing with the blue pill and if not taking it regularly  will see his brain is better, but lungs are worse  Can't go more than a few hours without taking the orange pills or has pulmonary symptoms  Can tell if he stops the blue pill  Taking the blue pill every other day right now            Current Medications     Current Outpatient Medications   Medication Instructions    albuterol 90 mcg/actuation inhaler 2 puffs, inhalation, Every 6 hours PRN    albuterol 2.5 mg, nebulization, Every 6 hours PRN    Altera Nebulizer Handset misc Dispense one mallory altera handset with cayston orders    Altera Nebulizer System misc URGENT: please dispense altera nebulizer system    aztreonam lysine (CAYSTON) 75 mg, nebulization, 3 times daily, Add 1 mL of sterile respiratory saline to openned vial, recap, swirl, then immediately add to nebulizer. 28 days on and 28 days off    Creon 24,000-76,000 -120,000 unit capsule Take 5 capsules by mouth 3 times a day with meals. May also take 2-3 capsules with snacks for snacks (Current wt. is 83.9 kg).    esomeprazole (NexIUM) 20 mg DR capsule 1 capsule, Daily before breakfast    levoFLOXacin (LEVAQUIN) 750 mg, oral, Every 24 hours    oseltamivir (TAMIFLU) 75 mg, oral, Every 12 hours    pediatric multivit 61-D3-vit K (MVW Complete Formulation ) 5,000-800 unit-mcg capsule 2 capsules, Daily    Pulmozyme 2.5 mg, Daily RT    Trikafta  "100-50-75 mg(d) /150 mg (n) tablet TAKE 2 ORANGE TABLETS IN THE MORNING AND 1 BLUE TABLET IN THE EVENING APPROXIMATELY 12 HOURS APART. TAKE WITH FATTY FOODS       Physical Examination     /81 (BP Location: Left arm, Patient Position: Sitting, BP Cuff Size: Adult)   Pulse 97   Temp 36.7 °C (98 °F) (Oral)   Resp 18   Ht 1.854 m (6' 0.99\")   Wt 84.2 kg (185 lb 11.8 oz)   SpO2 95%   BMI 24.51 kg/m²     GENERAL APPEARANCE: Healthy appearing, in no acute distress  SKIN: no rashes  HEAD, EYES, EARS, NOSE, THROAT:  Conjunctiva and sclera clear. Tympanic membranes normal. No rhinitis, nasal mucosa normal without polyps. Oropharynx unremarkable  NECK: No lymphadenopathy  CHEST: AP Diameter normal. No retractions. Auscultation reveals good air exchange without crackles or wheeze.   HEART: Normal rhythm, without murmur  ABDOMEN: Not distended. Normal bowel sounds. Soft and non-tender to palpation, without hepatomegaly or splenomegaly. No masses  EXTREMITIES: Without cyanosis or edema.   LYMPHATIC: No lymphadenopathy  MUSCULOSKELETAL: Unremarkable   NEUROLOGIC: Grossly intact      Spirometry:  11/29/2024  FVC  4.53 85%/ FEV1 2.24 53%/ FEV1/FVC 61%  10/04/2023:  FVC 4.29L   FEV1 2.28L  FEV1/FVC 53%      CF Sputum Culture     AFB Culture   Date Value Ref Range Status   11/29/2024   Preliminary    Culture in progress and will be examined weekly. A result will be issued either when positive or after 8 weeks incubation.      Respiratory Culture, Cystic Fibrosis   Date/Time Value Ref Range Status   11/29/2024 10:29 AM (3+) Moderate Normal throat aneudy  Preliminary   11/29/2024 10:29 AM (A)  Preliminary    (1+) Rare Methicillin Susceptible Staphylococcus aureus (MSSA)       Susceptibility data from last 90 days.  Collected Specimen Info Organism Clindamycin Erythromycin Oxacillin Tetracycline Trimethoprim/Sulfamethoxazole Vancomycin   11/29/24 Fluid from SPUTUM Methicillin Susceptible Staphylococcus aureus (MSSA)  R  R  S  " S  S  S     Normal throat aneudy             Problem List Items Addressed This Visit       Cystic fibrosis    Overview     Initial Diagnosis: At birth, had meconium ileus requiring surgery. Sweat chloride 76 mEq/L on 1980  Genotype: [id number] / [id number]    CFTR modulator: Trikafta  High dose ibuprofen: no  Azithromycin: no    Procedures & Oral/IV course:  - Last admission for CF pulmonary exacerbation 3/8/19- 3/30/19. Treated with IV Colistin and Meropenem.    History: Covid in 2020 (recovered), colon polyps   Hemoptysis:  DVT: no  Pneumothorax: no  CARLOS: age 14-15 had an obstruction, did clean out w/Golytely   Sinus surgery: no  Fertility: wife had a baby in June 2022           Relevant Medications    oseltamivir (Tamiflu) 75 mg capsule    levoFLOXacin (Levaquin) 750 mg tablet    Other Relevant Orders    Fungal Culture/Smear (Completed)    AFB Culture/Smear (Completed)    Respiratory Culture, Cystic Fibrosis (Completed)    Renal Function Panel    Hepatic Function Panel (Completed)    Basic Metabolic Panel (Completed)    Phosphorus (Completed)    Hemoglobin A1C (Completed)    Prostate Specific Antigen    Lipid Panel (Completed)    US abdomen limited liver (Completed)    XR DEXA bone density    Pulse oximetry, overnight    AFB Processed (Completed)     Other Visit Diagnoses       Elevated liver transaminase level    -  Primary    Relevant Orders    US abdomen limited liver (Completed)    XR DEXA bone density           # CF pulmonary disease - mild exacerbation  - completed the azithromycin for presumed mycoplasma, took 10 days instead of five  - will give him Levaquin to take if he is not doing better soon  - grows PSA  - on Trikafta with significant improvement     # Exocrine pancreatic insufficiency  - continue current treatment     # Acid reflux  - stable     # Child born in Jun 2022  - considering having a second child but the cost is outrageous  - consider seeing a reproductive doc in [facility] if they decide to  proceed    Plan for today's visit:  DEXA - scheuled for today  Ultrasound of liver - due to guidelines  Annual labs today - drawn in Clinic  Nocturnal desaturation - agreed to do this at home  See dermatology for skin care  Return in 6 months (at least once a year)  Vaccines:  Tdap, PCV20   If you decide to do fertility treatment in [place], let me know and I can help refer you appropriately      Follow-up:  Visit date not found     Shaneka Baca MD PhD   11/29/2024

## 2024-11-29 NOTE — PROGRESS NOTES
Nurse Assessment:  11/29/2024      ANNUAL APPT:  Has not had annual this year  CR: 3/7/24   Filed 3/5/24    Needs:   Annual labs: DUE  - done today in clinic   CXR: 10/4/2023  Audiology: 3/2019  - recommend for age appropriate   DEXA: 6/9/2021  Flu vaccine: Declined   COVID  Booster: Declined   Sick Plan:     *Tamiflu refill if needed - needs new script        SOB:  denied  2.    O2:  n/a  3.    Mediport: n/a  4.    Pt discussion/chief complaint/agenda:  Follow up sick call on 11/18/2024. Pt started Cayston at home - took appx 2 1/2 days worth  and was treated with Azthiromycin be NP (see Telephone call on 11/18/204). Feeling better but still has a lot of congestion in chest and ear still plugged. Pt will skip the blue Trikafta dose due to anxiety. Takes the orange dose religiously.

## 2024-11-29 NOTE — PATIENT INSTRUCTIONS
"    It was very nice to see you today. We can offer you in-person and \"virtual\" appointments with your cystic fibrosis provider.    If you need to cancel or schedule an appointment, please call the  at the [facility] at [phone] between the hours of 8 AM and 5 PM, Monday-Friday.    To reach the CF nurse, Cecilia, please call [phone]. Cecilia has a secure voice mail account if you want to leave a message.    If you need to speak with an adult cystic fibrosis provider between the hours of 5 PM and 8 AM (overnight) or anytime on Saturday or Sunday, please call [phone]. When you call this number, you will be connected to an  with the [facility] answering service. You should tell the  that you're an adult with cystic fibrosis who needs to speak to the \"pediatric pulmonary doctor on-call.\" The  will take your contact information. You should then expect a call back from the cystic fibrosis doctor on-call within a short period of time.        Follow up:  Pulmonology:  GI:  ENDO:  Other:  Plan for this visit:  DEXA  Ultrasound of liver  Annual labs today  Nocturnal desaturation  See dermatology for skin care  Return in 6 months (at least once a year)  Vaccines:  Tdap, PCV20   If you decide to do fertility treatment in [place], let me know and I can help refer you appropriately          Next appointment: Visit date not found    "

## 2024-11-29 NOTE — PROGRESS NOTES
Subjective   Patient ID: Tyler Lockett is a 44 y.o. male who presents for Cystic Fibrosis in outpatient clinic.    Social Work Visit Type: This is an Follow UpVisit for Tyler Lockett    Identifying Information                              : 1980  Assessment date: 2024    Objective     Patient accompanied by:  Self    Family System / Parents:    Raised by:  momand dadwith biological parent(s)  Mother:  Name:  Steph  Education: Highest Level of Education: High School  Father:  Name:  Fawad  Education: Highest Level of Education: Some College  Siblings & Children Information:  Siblings: sisters: 1    Relationships / Social History:  Patient lives with: is  and lives wiht his wife Inclose  Number of people in the house:, including patient: 3 They have a 2 year son named Haylie  Relationship status: Relationship Status / Family Dynamic: :  Yes  How Long?  5 years      Marital/relationship status:  no relationship issues at this time   Does patient have adequate housing?:Owns home  Does patient feel safe in home?: Yes  Supportive Relationships: spouse/partner    Education:  Education: Highest Level of Education: College Graduate  Employer information:  [employer]  Spouse Education: Trade School. [employer]  Spouse Employer information:Part Time    Income / Insurance:  Total combined household income (not just patient's income): incomelist: $90,000 or more     Did the patient receive free medicine or co-pay assistance from a Patient Assistance Program? No    Disability  Are you on any form of disability? no      Adherence and Understanding of Health:  Knowledge of health:  good, Overall adherence:  good, Adherence with medications:  good, Managed by: patient, and Understanding of medication:  good    Mood:  The patient was neatly groomed, appropriately dressed and adequately nourished.  How has your mood been lately?: Good  How do you manage stress?: Working with hands .  He has a motorcycle he enjoys riding and has a boat   What are your goals for this year?: Getting through  terrible  three's with his son  What about having CF do you find challenging? Staying on top of his treatments     Thought Processes   Organized?  yes  Have you ever been hospitalized in a psychiatric hospital? no    Do you currently or have you ever seen a therapist/counselor/psychiatrist? yes  In past.   Have you used medications for mental health issues, sleep and/or pain now or in the past?  no      Substance Use:  None    Advance Directives  Do you have an advance directive/living will?   No       Impression:    Tyler was engaged and and pleasant to take to at today's [facility] appt     SW engaged in active listening and supportive counseling. SW facilitated the expression of thoughts and feeling related to the issues noted above. SW reviewed contact information and availability for on-going support in between CF clinic appointments.      SW participated in a CF clinic huddle during which time the patients care plan, treatment needs and issues were discussed. The information noted above has been shared with the CF team during CF clinic.     GAD7: 4  PHQ9:5  Did patient have a positive suicide screen?  No      Assessment/Plan     Plan:     On-going psychosocial and emotional support, supportive counseling and referrals as indicated to maximize adjustment to living with cystic fibrosis.    NICOLLE Alexander  November 29, 2024

## 2024-11-29 NOTE — PROGRESS NOTES
Respiratory Note:    Patient's Airway Clearance: Afflo Vest  Frequency: PRN  Exercise: yes gym  Oscillating Device: no  Barry Cough: no  Primary: exercise  Secondary: vest    Aerosols: Name of medication, frequency, type of nebulizer used, Mask or Mouthpiece?  Bronchodilators: yes albuterol solution and inhaler PRN  Pulmozyme: no (does have a supply at home)  Hypertonic Saline: no  Antibiotics: yes will be starting [facility] for 28 days (received the shipment already)   ICS/Combinations: no    Method of Cleaning Neb Cups:     Pharmacy for respiratory meds: [facility]    Patient Assistance Program: yes [facility]     Oxygen: no    Have you smoked cigarettes in the last year?: no     Are you exposed to second hand smoke: no    Does anyone in your household smoke cigarettes?: no    Did you use electronic cigarettes (vape) this year?: no    During the reporting year, how often was this patient vaping?: not at all      Comments:      New [employer] Original [employer] system ordered - ready at [facility] - no yajaira - Lc instructed to call and schedule delivery

## 2024-11-30 LAB
ACID FAST STN SPEC: NORMAL
MYCOBACTERIUM SPEC CULT: NORMAL

## 2024-12-01 LAB
BACTERIA SPT CF RESP CULT: ABNORMAL
BACTERIA SPT CF RESP CULT: ABNORMAL
FUNGUS SPEC CULT: NORMAL
FUNGUS SPEC FUNGUS STN: NORMAL

## 2024-12-02 ENCOUNTER — APPOINTMENT (OUTPATIENT)
Dept: LAB | Facility: LAB | Age: 44
End: 2024-12-02
Payer: COMMERCIAL

## 2024-12-02 LAB
A-TOCOPHEROL VIT E SERPL-MCNC: 4.4 MG/L (ref 5.5–18)
ANNOTATION COMMENT IMP: NORMAL
BACTERIA SPT CF RESP CULT: ABNORMAL
BACTERIA SPT CF RESP CULT: ABNORMAL
BETA+GAMMA TOCOPHEROL SERPL-MCNC: 0.3 MG/L (ref 0–6)
RETINYL PALMITATE SERPL-MCNC: <0.02 MG/L (ref 0–0.1)
SCAN RESULT: NORMAL
VIT A SERPL-MCNC: 0.35 MG/L (ref 0.3–1.2)

## 2024-12-03 LAB — PSA SERPL-MCNC: 0.97 NG/ML

## 2024-12-04 LAB
ACID FAST STN SPEC: NORMAL
FUNGUS SPEC CULT: NORMAL
FUNGUS SPEC FUNGUS STN: NORMAL
MYCOBACTERIUM SPEC CULT: NORMAL

## 2024-12-05 LAB
TESTOSTERONE FREE (CHAN): 68.8 PG/ML (ref 35–155)
TESTOSTERONE,TOTAL,LC-MS/MS: 583 NG/DL (ref 250–1100)

## 2024-12-09 DIAGNOSIS — E84.9 CYSTIC FIBROSIS: ICD-10-CM

## 2024-12-09 LAB
FUNGUS SPEC CULT: NORMAL
FUNGUS SPEC FUNGUS STN: NORMAL

## 2024-12-09 RX ORDER — ELEXACAFTOR, TEZACAFTOR, AND IVACAFTOR 100-50-75
KIT ORAL
Qty: 84 TABLET | Refills: 6 | Status: SHIPPED | OUTPATIENT
Start: 2024-12-09

## 2024-12-11 LAB
ACID FAST STN SPEC: NORMAL
MYCOBACTERIUM SPEC CULT: NORMAL

## 2024-12-16 LAB
FUNGUS SPEC CULT: NORMAL
FUNGUS SPEC FUNGUS STN: NORMAL

## 2024-12-18 LAB
ACID FAST STN SPEC: NORMAL
MYCOBACTERIUM SPEC CULT: NORMAL

## 2024-12-20 ENCOUNTER — TELEPHONE (OUTPATIENT)
Dept: PEDIATRIC PULMONOLOGY | Facility: HOSPITAL | Age: 44
End: 2024-12-20
Payer: COMMERCIAL

## 2024-12-20 NOTE — TELEPHONE ENCOUNTER
I called Lc and reviewed his nocturnal pulse ox study results on room air.   24 seconds = 89% Sp02  Basal Sp02 92.8%

## 2024-12-24 LAB
ACID FAST STN SPEC: NORMAL
MYCOBACTERIUM SPEC CULT: NORMAL

## 2024-12-31 LAB
ACID FAST STN SPEC: NORMAL
MYCOBACTERIUM SPEC CULT: NORMAL

## 2025-01-06 DIAGNOSIS — E84.9 CF (CYSTIC FIBROSIS) (MULTI): ICD-10-CM

## 2025-01-06 DIAGNOSIS — A49.8 PSEUDOMONAS AERUGINOSA INFECTION: ICD-10-CM

## 2025-01-06 DIAGNOSIS — E84.0 CYSTIC FIBROSIS WITH PULMONARY MANIFESTATIONS (MULTI): ICD-10-CM

## 2025-01-06 RX ORDER — NEBULIZER
EACH MISCELLANEOUS
Qty: 1 EACH | Refills: 1 | Status: SHIPPED | OUTPATIENT
Start: 2025-01-06

## 2025-01-06 RX ORDER — AZTREONAM 75 MG/ML
KIT INHALATION
Qty: 84 ML | Refills: 1 | Status: SHIPPED | OUTPATIENT
Start: 2025-01-06

## 2025-01-08 LAB
ACID FAST STN SPEC: NORMAL
MYCOBACTERIUM SPEC CULT: NORMAL

## 2025-01-15 LAB
ACID FAST STN SPEC: NORMAL
MYCOBACTERIUM SPEC CULT: NORMAL

## 2025-01-22 LAB
ACID FAST STN SPEC: NORMAL
MYCOBACTERIUM SPEC CULT: NORMAL

## 2025-06-23 LAB
MGC ASCENT PFT - FEV1 - PRE: 2.24
MGC ASCENT PFT - FEV1 - PREDICTED: 4.21
MGC ASCENT PFT - FVC - PRE: 4.53
MGC ASCENT PFT - FVC - PREDICTED: 5.27

## 2025-06-24 ENCOUNTER — SPECIALTY PHARMACY (OUTPATIENT)
Dept: PHARMACY | Facility: CLINIC | Age: 45
End: 2025-06-24

## 2025-08-11 DIAGNOSIS — E84.9 CYSTIC FIBROSIS: ICD-10-CM

## 2025-08-11 RX ORDER — ELEXACAFTOR, TEZACAFTOR, AND IVACAFTOR 100-50-75
KIT ORAL
Qty: 84 TABLET | Refills: 6 | Status: SHIPPED | OUTPATIENT
Start: 2025-08-11

## 2025-08-22 ENCOUNTER — TELEPHONE (OUTPATIENT)
Dept: PEDIATRIC PULMONOLOGY | Facility: HOSPITAL | Age: 45
End: 2025-08-22
Payer: COMMERCIAL